# Patient Record
Sex: MALE | Race: BLACK OR AFRICAN AMERICAN | NOT HISPANIC OR LATINO | Employment: FULL TIME | RURAL
[De-identification: names, ages, dates, MRNs, and addresses within clinical notes are randomized per-mention and may not be internally consistent; named-entity substitution may affect disease eponyms.]

---
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
## 2021-04-29 ENCOUNTER — OFFICE VISIT (OUTPATIENT)
Dept: FAMILY MEDICINE | Facility: CLINIC | Age: 21
End: 2021-04-29
Payer: MEDICAID

## 2021-04-29 VITALS
TEMPERATURE: 98 F | OXYGEN SATURATION: 100 % | DIASTOLIC BLOOD PRESSURE: 94 MMHG | BODY MASS INDEX: 37.93 KG/M2 | HEART RATE: 66 BPM | HEIGHT: 72 IN | RESPIRATION RATE: 17 BRPM | WEIGHT: 280 LBS | SYSTOLIC BLOOD PRESSURE: 137 MMHG

## 2021-04-29 DIAGNOSIS — M54.2 DORSALGIA OF CERVICOTHORACIC REGION: Primary | ICD-10-CM

## 2021-04-29 DIAGNOSIS — M62.838 MUSCLE SPASM: ICD-10-CM

## 2021-04-29 DIAGNOSIS — M54.6 DORSALGIA OF CERVICOTHORACIC REGION: Primary | ICD-10-CM

## 2021-04-29 PROCEDURE — 96372 PR INJECTION,THERAP/PROPH/DIAG2ST, IM OR SUBCUT: ICD-10-PCS | Mod: ,,, | Performed by: NURSE PRACTITIONER

## 2021-04-29 PROCEDURE — 99213 PR OFFICE/OUTPT VISIT, EST, LEVL III, 20-29 MIN: ICD-10-PCS | Mod: 25,,, | Performed by: NURSE PRACTITIONER

## 2021-04-29 PROCEDURE — 99213 OFFICE O/P EST LOW 20 MIN: CPT | Mod: 25,,, | Performed by: NURSE PRACTITIONER

## 2021-04-29 PROCEDURE — 96372 THER/PROPH/DIAG INJ SC/IM: CPT | Mod: ,,, | Performed by: NURSE PRACTITIONER

## 2021-04-29 RX ORDER — KETOROLAC TROMETHAMINE 30 MG/ML
60 INJECTION, SOLUTION INTRAMUSCULAR; INTRAVENOUS
Status: COMPLETED | OUTPATIENT
Start: 2021-04-29 | End: 2021-04-29

## 2021-04-29 RX ORDER — DICLOFENAC SODIUM 75 MG/1
75 TABLET, DELAYED RELEASE ORAL 2 TIMES DAILY PRN
Qty: 60 TABLET | Refills: 0 | Status: SHIPPED | OUTPATIENT
Start: 2021-04-29 | End: 2023-05-24 | Stop reason: CLARIF

## 2021-04-29 RX ORDER — TIZANIDINE 4 MG/1
4 TABLET ORAL EVERY 8 HOURS PRN
Qty: 60 TABLET | Refills: 2 | Status: SHIPPED | OUTPATIENT
Start: 2021-04-29 | End: 2023-05-24 | Stop reason: CLARIF

## 2021-04-29 RX ORDER — DEXAMETHASONE SODIUM PHOSPHATE 4 MG/ML
6 INJECTION, SOLUTION INTRA-ARTICULAR; INTRALESIONAL; INTRAMUSCULAR; INTRAVENOUS; SOFT TISSUE
Status: COMPLETED | OUTPATIENT
Start: 2021-04-29 | End: 2021-04-29

## 2021-04-29 RX ADMIN — DEXAMETHASONE SODIUM PHOSPHATE 6 MG: 4 INJECTION, SOLUTION INTRA-ARTICULAR; INTRALESIONAL; INTRAMUSCULAR; INTRAVENOUS; SOFT TISSUE at 02:04

## 2021-04-29 RX ADMIN — KETOROLAC TROMETHAMINE 60 MG: 30 INJECTION, SOLUTION INTRAMUSCULAR; INTRAVENOUS at 02:04

## 2021-12-08 ENCOUNTER — OFFICE VISIT (OUTPATIENT)
Dept: FAMILY MEDICINE | Facility: CLINIC | Age: 21
End: 2021-12-08
Payer: MEDICAID

## 2021-12-08 VITALS
HEART RATE: 97 BPM | SYSTOLIC BLOOD PRESSURE: 157 MMHG | RESPIRATION RATE: 17 BRPM | WEIGHT: 300 LBS | DIASTOLIC BLOOD PRESSURE: 119 MMHG | TEMPERATURE: 98 F | HEIGHT: 71 IN | OXYGEN SATURATION: 98 % | BODY MASS INDEX: 42 KG/M2

## 2021-12-08 DIAGNOSIS — R51.9 FREQUENT HEADACHES: ICD-10-CM

## 2021-12-08 DIAGNOSIS — I10 PRIMARY HYPERTENSION: Primary | ICD-10-CM

## 2021-12-08 LAB
ANION GAP SERPL CALCULATED.3IONS-SCNC: 5 MMOL/L (ref 7–16)
BUN SERPL-MCNC: 10 MG/DL (ref 7–18)
BUN/CREAT SERPL: 10 (ref 6–20)
CALCIUM SERPL-MCNC: 9.9 MG/DL (ref 8.5–10.1)
CHLORIDE SERPL-SCNC: 108 MMOL/L (ref 98–107)
CO2 SERPL-SCNC: 29 MMOL/L (ref 21–32)
CREAT SERPL-MCNC: 1.03 MG/DL (ref 0.7–1.3)
GLUCOSE SERPL-MCNC: 98 MG/DL (ref 74–106)
POTASSIUM SERPL-SCNC: 4.2 MMOL/L (ref 3.5–5.1)
SODIUM SERPL-SCNC: 138 MMOL/L (ref 136–145)

## 2021-12-08 PROCEDURE — 80048 BASIC METABOLIC PNL TOTAL CA: CPT | Mod: ,,, | Performed by: CLINICAL MEDICAL LABORATORY

## 2021-12-08 PROCEDURE — 80048 BASIC METABOLIC PANEL: ICD-10-PCS | Mod: ,,, | Performed by: CLINICAL MEDICAL LABORATORY

## 2021-12-08 PROCEDURE — 99214 OFFICE O/P EST MOD 30 MIN: CPT | Mod: ,,, | Performed by: FAMILY MEDICINE

## 2021-12-08 PROCEDURE — 99214 PR OFFICE/OUTPT VISIT, EST, LEVL IV, 30-39 MIN: ICD-10-PCS | Mod: ,,, | Performed by: FAMILY MEDICINE

## 2021-12-08 RX ORDER — IBUPROFEN 800 MG/1
800 TABLET ORAL 3 TIMES DAILY PRN
Qty: 20 TABLET | Refills: 0 | Status: SHIPPED | OUTPATIENT
Start: 2021-12-08 | End: 2023-05-24 | Stop reason: CLARIF

## 2021-12-08 RX ORDER — HYDROCHLOROTHIAZIDE 25 MG/1
25 TABLET ORAL DAILY
Qty: 30 TABLET | Refills: 5 | Status: SHIPPED | OUTPATIENT
Start: 2021-12-08 | End: 2022-12-08

## 2021-12-08 RX ORDER — SUMATRIPTAN 50 MG/1
TABLET, FILM COATED ORAL
Qty: 10 TABLET | Refills: 0 | Status: SHIPPED | OUTPATIENT
Start: 2021-12-08 | End: 2023-05-24 | Stop reason: CLARIF

## 2022-02-06 ENCOUNTER — OFFICE VISIT (OUTPATIENT)
Dept: FAMILY MEDICINE | Facility: CLINIC | Age: 22
End: 2022-02-06
Payer: MEDICAID

## 2022-02-06 VITALS
SYSTOLIC BLOOD PRESSURE: 139 MMHG | BODY MASS INDEX: 42 KG/M2 | OXYGEN SATURATION: 99 % | WEIGHT: 300 LBS | HEART RATE: 76 BPM | DIASTOLIC BLOOD PRESSURE: 115 MMHG | RESPIRATION RATE: 18 BRPM | HEIGHT: 71 IN | TEMPERATURE: 98 F

## 2022-02-06 DIAGNOSIS — Z11.3 SCREENING FOR STD (SEXUALLY TRANSMITTED DISEASE): Primary | ICD-10-CM

## 2022-02-06 PROCEDURE — 87491 CHLMYD TRACH DNA AMP PROBE: CPT | Mod: ,,, | Performed by: CLINICAL MEDICAL LABORATORY

## 2022-02-06 PROCEDURE — 87389 HIV-1 AG W/HIV-1&-2 AB AG IA: CPT | Mod: ,,, | Performed by: CLINICAL MEDICAL LABORATORY

## 2022-02-06 PROCEDURE — 86695 HERPES SIMPLEX TYPE 1 TEST: CPT | Mod: ,,, | Performed by: CLINICAL MEDICAL LABORATORY

## 2022-02-06 PROCEDURE — 87491 CHLAMYDIA/GONORRHOEAE(GC), PCR: ICD-10-PCS | Mod: ,,, | Performed by: CLINICAL MEDICAL LABORATORY

## 2022-02-06 PROCEDURE — 86696 HERPES SIMPLEX TYPE 2 TEST: CPT | Mod: ,,, | Performed by: CLINICAL MEDICAL LABORATORY

## 2022-02-06 PROCEDURE — 1159F MED LIST DOCD IN RCRD: CPT | Mod: CPTII,,, | Performed by: FAMILY MEDICINE

## 2022-02-06 PROCEDURE — 3080F DIAST BP >= 90 MM HG: CPT | Mod: CPTII,,, | Performed by: FAMILY MEDICINE

## 2022-02-06 PROCEDURE — 1160F PR REVIEW ALL MEDS BY PRESCRIBER/CLIN PHARMACIST DOCUMENTED: ICD-10-PCS | Mod: CPTII,,, | Performed by: FAMILY MEDICINE

## 2022-02-06 PROCEDURE — 86780 TREPONEMA PALLIDUM: CPT | Mod: ,,, | Performed by: CLINICAL MEDICAL LABORATORY

## 2022-02-06 PROCEDURE — 99214 OFFICE O/P EST MOD 30 MIN: CPT | Mod: ,,, | Performed by: FAMILY MEDICINE

## 2022-02-06 PROCEDURE — 3008F BODY MASS INDEX DOCD: CPT | Mod: CPTII,,, | Performed by: FAMILY MEDICINE

## 2022-02-06 PROCEDURE — 86695 HERPES SIMPLEX 1 & 2 IGG: ICD-10-PCS | Mod: ,,, | Performed by: CLINICAL MEDICAL LABORATORY

## 2022-02-06 PROCEDURE — 1160F RVW MEDS BY RX/DR IN RCRD: CPT | Mod: CPTII,,, | Performed by: FAMILY MEDICINE

## 2022-02-06 PROCEDURE — 1159F PR MEDICATION LIST DOCUMENTED IN MEDICAL RECORD: ICD-10-PCS | Mod: CPTII,,, | Performed by: FAMILY MEDICINE

## 2022-02-06 PROCEDURE — 87389 HIV 1 / 2 ANTIBODY: ICD-10-PCS | Mod: ,,, | Performed by: CLINICAL MEDICAL LABORATORY

## 2022-02-06 PROCEDURE — 86694 HERPES SIMPLEX 1 & 2 IGM: ICD-10-PCS | Mod: ,,, | Performed by: CLINICAL MEDICAL LABORATORY

## 2022-02-06 PROCEDURE — 86696 HERPES SIMPLEX 1 & 2 IGG: ICD-10-PCS | Mod: ,,, | Performed by: CLINICAL MEDICAL LABORATORY

## 2022-02-06 PROCEDURE — 99051 PR MEDICAL SERVICES, EVE/WKEND/HOLIDAY: ICD-10-PCS | Mod: ,,, | Performed by: FAMILY MEDICINE

## 2022-02-06 PROCEDURE — 3080F PR MOST RECENT DIASTOLIC BLOOD PRESSURE >= 90 MM HG: ICD-10-PCS | Mod: CPTII,,, | Performed by: FAMILY MEDICINE

## 2022-02-06 PROCEDURE — 86780 TREPONEMA PALLIDUM (SYPHILIS) ANTIBODY: ICD-10-PCS | Mod: ,,, | Performed by: CLINICAL MEDICAL LABORATORY

## 2022-02-06 PROCEDURE — 86694 HERPES SIMPLEX NES ANTBDY: CPT | Mod: ,,, | Performed by: CLINICAL MEDICAL LABORATORY

## 2022-02-06 PROCEDURE — 99051 MED SERV EVE/WKEND/HOLIDAY: CPT | Mod: ,,, | Performed by: FAMILY MEDICINE

## 2022-02-06 PROCEDURE — 3008F PR BODY MASS INDEX (BMI) DOCUMENTED: ICD-10-PCS | Mod: CPTII,,, | Performed by: FAMILY MEDICINE

## 2022-02-06 PROCEDURE — 87591 CHLAMYDIA/GONORRHOEAE(GC), PCR: ICD-10-PCS | Mod: ,,, | Performed by: CLINICAL MEDICAL LABORATORY

## 2022-02-06 PROCEDURE — 99214 PR OFFICE/OUTPT VISIT, EST, LEVL IV, 30-39 MIN: ICD-10-PCS | Mod: ,,, | Performed by: FAMILY MEDICINE

## 2022-02-06 PROCEDURE — 3075F SYST BP GE 130 - 139MM HG: CPT | Mod: CPTII,,, | Performed by: FAMILY MEDICINE

## 2022-02-06 PROCEDURE — 87591 N.GONORRHOEAE DNA AMP PROB: CPT | Mod: ,,, | Performed by: CLINICAL MEDICAL LABORATORY

## 2022-02-06 PROCEDURE — 3075F PR MOST RECENT SYSTOLIC BLOOD PRESS GE 130-139MM HG: ICD-10-PCS | Mod: CPTII,,, | Performed by: FAMILY MEDICINE

## 2022-02-06 RX ORDER — VALACYCLOVIR HYDROCHLORIDE 1 G/1
1000 TABLET, FILM COATED ORAL 3 TIMES DAILY
Qty: 21 TABLET | Refills: 2 | Status: SHIPPED | OUTPATIENT
Start: 2022-02-06 | End: 2022-02-13

## 2022-02-06 NOTE — PROGRESS NOTES
Subjective:       Patient ID: Ángel Golden is a 21 y.o. male.    Chief Complaint: STD CHECK (States there is lesions on the penis. Noticed Friday. Pain. No known exposure of STD.)    HPI  Review of Systems   Constitutional: Negative for activity change, appetite change, chills, diaphoresis, fatigue, fever and unexpected weight change.   HENT: Negative for nasal congestion, dental problem, drooling, ear discharge, ear pain, facial swelling, hearing loss, mouth sores, nosebleeds, postnasal drip, rhinorrhea, sinus pressure/congestion, sneezing, sore throat, tinnitus, trouble swallowing, voice change and goiter.    Eyes: Negative for photophobia, pain, discharge, redness, itching and visual disturbance.   Respiratory: Negative for apnea, cough, choking, chest tightness, shortness of breath, wheezing and stridor.    Cardiovascular: Negative for chest pain, palpitations, leg swelling and claudication.   Gastrointestinal: Negative for abdominal distention, abdominal pain, anal bleeding, blood in stool, change in bowel habit, constipation, diarrhea, nausea, vomiting, reflux, fecal incontinence and change in bowel habit.   Endocrine: Negative for cold intolerance, heat intolerance, polydipsia, polyphagia and polyuria.   Genitourinary: Positive for penile pain. Negative for bladder incontinence, decreased urine volume, difficulty urinating, discharge, dysuria, enuresis, erectile dysfunction, flank pain, frequency, genital sores, hematuria, testicular pain and urgency.   Musculoskeletal: Negative for arthralgias, back pain, gait problem, joint swelling, leg pain, myalgias, neck pain, neck stiffness and joint deformity.   Integumentary:  Positive for rash. Negative for pallor, wound and mole/lesion.   Allergic/Immunologic: Negative for environmental allergies, food allergies and frequent infections.   Neurological: Negative for dizziness, vertigo, tremors, seizures, syncope, facial asymmetry, speech difficulty, weakness,  light-headedness, numbness, headaches, disturbances in coordination, memory loss and coordination difficulties.   Hematological: Negative for adenopathy. Does not bruise/bleed easily.   Psychiatric/Behavioral: Negative for agitation, behavioral problems, confusion, decreased concentration, dysphoric mood, hallucinations, self-injury, sleep disturbance and suicidal ideas. The patient is not nervous/anxious and is not hyperactive.          Objective:      Physical Exam  Vitals reviewed.   Constitutional:       Appearance: Normal appearance. He is normal weight.   HENT:      Head: Normocephalic and atraumatic.      Right Ear: Tympanic membrane and ear canal normal.      Left Ear: Tympanic membrane, ear canal and external ear normal.      Nose: Nose normal.      Mouth/Throat:      Mouth: Mucous membranes are moist.      Pharynx: Oropharynx is clear.   Eyes:      Extraocular Movements: Extraocular movements intact.      Conjunctiva/sclera: Conjunctivae normal.      Pupils: Pupils are equal, round, and reactive to light.   Cardiovascular:      Rate and Rhythm: Normal rate and regular rhythm.      Pulses: Normal pulses.      Heart sounds: Normal heart sounds.   Pulmonary:      Effort: Pulmonary effort is normal.      Breath sounds: Normal breath sounds.   Abdominal:      General: Abdomen is flat. Bowel sounds are normal.      Palpations: Abdomen is soft.   Genitourinary:     Comments: 2 ulcerated lesion on head of penis.  Musculoskeletal:         General: Normal range of motion.      Cervical back: Normal range of motion and neck supple.   Skin:     General: Skin is warm and dry.   Neurological:      General: No focal deficit present.      Mental Status: He is alert and oriented to person, place, and time. Mental status is at baseline.   Psychiatric:         Mood and Affect: Mood normal.         Behavior: Behavior normal.         Thought Content: Thought content normal.         Judgment: Judgment normal.          Assessment:       1. Screening for STD (sexually transmitted disease)        Plan:     Screening for STD (sexually transmitted disease)  -     HSV 1 & 2, IgM; Future; Expected date: 02/06/2022  -     HSV 1 & 2, IgG; Future; Expected date: 02/06/2022  -     Syphilis Antibody with reflex to RPR; Future; Expected date: 02/06/2022  -     HIV 1/2 Ag/Ab (4th Gen); Future; Expected date: 02/06/2022  -     Chlamydia/GC, PCR; Future; Expected date: 02/06/2022    Other orders  -     valACYclovir (VALTREX) 1000 MG tablet; Take 1 tablet (1,000 mg total) by mouth 3 (three) times daily. for 7 days  Dispense: 21 tablet; Refill: 2       Ulcerated lesions likely from herpes, will treat presumptively for this, will check std panel.

## 2022-02-07 LAB
CHLAMYDIA BY PCR: NEGATIVE
HIV 1+O+2 AB SERPL QL: NORMAL
N. GONORRHOEAE (GC) BY PCR: NEGATIVE
SYPHILIS AB INTERPRETATION: NORMAL

## 2022-02-08 LAB
HSV TYPE 1 AB IGG INDEX: 0.22
HSV TYPE 2 AB IGG INDEX: 0.16
HSV1 IGG SER QL: NEGATIVE
HSV2 IGG SER QL: NEGATIVE

## 2022-02-09 LAB — HSV IGM SER QL IA: NEGATIVE

## 2022-02-14 ENCOUNTER — TELEPHONE (OUTPATIENT)
Dept: FAMILY MEDICINE | Facility: CLINIC | Age: 22
End: 2022-02-14
Payer: MEDICAID

## 2022-02-14 NOTE — TELEPHONE ENCOUNTER
Notified pt of normal labs. Pt verbalized understanding with no further questions. ----- Message from Elsie Donaldson LPN sent at 2/14/2022  7:55 AM CST -----    ----- Message -----  From: Jose D Mayers II, DO  Sent: 2/10/2022   6:53 PM CST  To: Elsie Donaldson LPN    Std panel negative, notify patient

## 2023-01-10 ENCOUNTER — LAB VISIT (OUTPATIENT)
Dept: PRIMARY CARE CLINIC | Facility: CLINIC | Age: 23
End: 2023-01-10

## 2023-01-10 DIAGNOSIS — Z02.83 ENCOUNTER FOR DRUG SCREENING: Primary | ICD-10-CM

## 2023-01-10 PROCEDURE — 99000 PR SPECIMEN HANDLING,DR OFF->LAB: ICD-10-PCS | Mod: ,,, | Performed by: NURSE PRACTITIONER

## 2023-01-10 PROCEDURE — 99000 SPECIMEN HANDLING OFFICE-LAB: CPT | Mod: ,,, | Performed by: NURSE PRACTITIONER

## 2023-01-10 NOTE — PROGRESS NOTES
Subjective:       Patient ID: Ángel Golden is a 22 y.o. male.    Chief Complaint: No chief complaint on file.    HPI  Review of Systems      Objective:      Physical Exam    Assessment:       Problem List Items Addressed This Visit    None  Visit Diagnoses       Encounter for drug screening    -  Primary            Plan:       Drug testing only

## 2023-03-20 ENCOUNTER — HOSPITAL ENCOUNTER (EMERGENCY)
Facility: HOSPITAL | Age: 23
Discharge: HOME | End: 2023-03-20
Attending: EMERGENCY MEDICINE
Payer: MEDICAID

## 2023-03-20 VITALS
RESPIRATION RATE: 20 BRPM | SYSTOLIC BLOOD PRESSURE: 116 MMHG | WEIGHT: 258 LBS | OXYGEN SATURATION: 98 % | TEMPERATURE: 99 F | HEART RATE: 70 BPM | DIASTOLIC BLOOD PRESSURE: 73 MMHG | BODY MASS INDEX: 36.12 KG/M2 | HEIGHT: 71 IN

## 2023-03-20 DIAGNOSIS — J06.9 VIRAL URI WITH COUGH: Primary | ICD-10-CM

## 2023-03-20 PROCEDURE — 99283 EMERGENCY DEPT VISIT LOW MDM: CPT | Mod: ,,, | Performed by: EMERGENCY MEDICINE

## 2023-03-20 PROCEDURE — 99283 EMERGENCY DEPT VISIT LOW MDM: CPT

## 2023-03-20 PROCEDURE — 99283 PR EMERGENCY DEPT VISIT,LEVEL III: ICD-10-PCS | Mod: ,,, | Performed by: EMERGENCY MEDICINE

## 2023-03-20 RX ORDER — BENZONATATE 200 MG/1
200 CAPSULE ORAL 3 TIMES DAILY PRN
Qty: 30 CAPSULE | Refills: 0 | Status: SHIPPED | OUTPATIENT
Start: 2023-03-20 | End: 2023-03-30

## 2023-03-20 RX ORDER — GUAIFENESIN 600 MG/1
TABLET, EXTENDED RELEASE ORAL
Qty: 40 TABLET | Refills: 0 | Status: SHIPPED | OUTPATIENT
Start: 2023-03-20 | End: 2023-05-24 | Stop reason: CLARIF

## 2023-03-20 NOTE — DISCHARGE INSTRUCTIONS
TAKE MUCINEX AND TESSALON AS DIRECTED.  DRINK PLENTY OF FLUIDS.  FOLLOW UP IF SYMPTOMS PERSIST OR WORSEN OR OTHERWISE AS NEEDED.

## 2023-03-20 NOTE — Clinical Note
"Ángel Rodríguez" [place] was seen and treated in our emergency department on 3/20/2023.  He may return to work on 03/21/2023.       If you have any questions or concerns, please don't hesitate to call.      Souleymane HOPE    "

## 2023-03-20 NOTE — ED PROVIDER NOTES
Encounter Date: 3/20/2023       History     Chief Complaint   Patient presents with    Sore Throat     21 y/o male with cough and nasal congestion that started 2 days ago.  He reports mild intermittent sore throat.  Family members have had similar symptoms.      Review of patient's allergies indicates:  No Known Allergies  No past medical history on file.  No past surgical history on file.  No family history on file.  Social History     Tobacco Use    Smoking status: Never    Smokeless tobacco: Never   Substance Use Topics    Alcohol use: Never    Drug use: Never     Review of Systems   All other systems reviewed and are negative.    Physical Exam     Initial Vitals [03/20/23 0836]   BP Pulse Resp Temp SpO2   116/73 70 20 98.6 °F (37 °C) 98 %      MAP       --         Physical Exam    Nursing note and vitals reviewed.  Constitutional: He appears well-developed and well-nourished.   HENT:   Head: Normocephalic and atraumatic.   Post-nasal drainage.  Nasal mucosal edema.     Eyes: Conjunctivae and EOM are normal. Pupils are equal, round, and reactive to light.   Neck: Neck supple.   Normal range of motion.  Cardiovascular:  Normal rate, regular rhythm, normal heart sounds and intact distal pulses.           Pulmonary/Chest: Breath sounds normal.   Abdominal: Abdomen is soft. Bowel sounds are normal.   Musculoskeletal:         General: Normal range of motion.      Cervical back: Normal range of motion and neck supple.     Neurological: He is alert and oriented to person, place, and time. He has normal strength. GCS score is 15. GCS eye subscore is 4. GCS verbal subscore is 5. GCS motor subscore is 6.   Skin: Skin is warm and dry. Capillary refill takes less than 2 seconds.   Psychiatric: He has a normal mood and affect.       Medical Screening Exam   See Full Note    ED Course   Procedures  Labs Reviewed - No data to display       Imaging Results    None          Medications - No data to display  Medical Decision  Making:   Initial Assessment:   Cough and nasal congestion and sore throat.    Differential Diagnosis:   Uri vs allergic rhinitis vs other  ED Management:  DX:  URI.  Rx:  tessalon and mucinex.                   Clinical Impression:   Final diagnoses:  [J06.9] Viral URI with cough (Primary)        ED Disposition Condition    Discharge Stable          ED Prescriptions       Medication Sig Dispense Start Date End Date Auth. Provider    benzonatate (TESSALON) 200 MG capsule Take 1 capsule (200 mg total) by mouth 3 (three) times daily as needed for Cough. 30 capsule 3/20/2023 3/30/2023 Will Silvestre MD    guaiFENesin (MUCINEX) 600 mg 12 hr tablet 2 tablets po every 12 hours as needed. 40 tablet 3/20/2023 -- Will Silvestre MD          Follow-up Information       Follow up With Specialties Details Why Contact Info    PRIMARY CARE PROVIDER   As needed              Will Silvestre MD  03/20/23 2361       Will Silvestre MD  03/20/23 9302

## 2023-04-25 ENCOUNTER — HOSPITAL ENCOUNTER (EMERGENCY)
Facility: HOSPITAL | Age: 23
Discharge: HOME | End: 2023-04-25
Attending: EMERGENCY MEDICINE
Payer: MEDICAID

## 2023-04-25 VITALS
SYSTOLIC BLOOD PRESSURE: 134 MMHG | BODY MASS INDEX: 35.42 KG/M2 | TEMPERATURE: 97 F | HEIGHT: 71 IN | HEART RATE: 72 BPM | RESPIRATION RATE: 16 BRPM | DIASTOLIC BLOOD PRESSURE: 91 MMHG | WEIGHT: 253 LBS | OXYGEN SATURATION: 98 %

## 2023-04-25 DIAGNOSIS — H60.01 ABSCESS, EAR CANAL, RIGHT: Primary | ICD-10-CM

## 2023-04-25 PROCEDURE — 10060 I&D ABSCESS SIMPLE/SINGLE: CPT | Mod: ,,, | Performed by: EMERGENCY MEDICINE

## 2023-04-25 PROCEDURE — 10060 I&D ABSCESS SIMPLE/SINGLE: CPT

## 2023-04-25 PROCEDURE — 99284 EMERGENCY DEPT VISIT MOD MDM: CPT | Mod: 25,,, | Performed by: EMERGENCY MEDICINE

## 2023-04-25 PROCEDURE — 25000003 PHARM REV CODE 250: Performed by: EMERGENCY MEDICINE

## 2023-04-25 PROCEDURE — 10060 PR DRAIN SKIN ABSCESS SIMPLE: ICD-10-PCS | Mod: ,,, | Performed by: EMERGENCY MEDICINE

## 2023-04-25 PROCEDURE — 69000 DRG XTRNL EAR ABSC/HEM SMPL: CPT

## 2023-04-25 PROCEDURE — 99284 PR EMERGENCY DEPT VISIT,LEVEL IV: ICD-10-PCS | Mod: 25,,, | Performed by: EMERGENCY MEDICINE

## 2023-04-25 PROCEDURE — 99283 EMERGENCY DEPT VISIT LOW MDM: CPT | Mod: 25

## 2023-04-25 RX ORDER — SULFAMETHOXAZOLE AND TRIMETHOPRIM 800; 160 MG/1; MG/1
1 TABLET ORAL 2 TIMES DAILY
Qty: 14 TABLET | Refills: 0 | Status: SHIPPED | OUTPATIENT
Start: 2023-04-25 | End: 2023-05-02

## 2023-04-25 RX ORDER — LIDOCAINE HYDROCHLORIDE 10 MG/ML
5 INJECTION, SOLUTION EPIDURAL; INFILTRATION; INTRACAUDAL; PERINEURAL
Status: COMPLETED | OUTPATIENT
Start: 2023-04-25 | End: 2023-04-25

## 2023-04-25 RX ADMIN — LIDOCAINE HYDROCHLORIDE 50 MG: 10 SOLUTION INTRAVENOUS at 08:04

## 2023-04-25 NOTE — ED NOTES
Consent signed for Incision and Drainage after risk and benefits discussed per Dr Moss.  Dr Moss in room to perform Incision and Drainage of Right Ear.

## 2023-04-25 NOTE — Clinical Note
"Ángel Velazquezolivia Golden was seen and treated in our emergency department on 4/25/2023.  He may return to work on 04/26/2023.       If you have any questions or concerns, please don't hesitate to call.      Grzegorz Moss MD"

## 2023-04-25 NOTE — ED PROVIDER NOTES
Encounter Date: 4/25/2023       History     Chief Complaint   Patient presents with    Abscess     Complains of a sore swollen area to the area of the right external pinna/external auditory canal junction.  Minimal pain.  No fever.  No hearing loss.    Review of patient's allergies indicates:  No Known Allergies  Past Medical History:   Diagnosis Date    Hypertension      History reviewed. No pertinent surgical history.  History reviewed. No pertinent family history.  Social History     Tobacco Use    Smoking status: Every Day     Types: Cigarettes    Smokeless tobacco: Never   Substance Use Topics    Alcohol use: Never    Drug use: Never     Review of Systems   Constitutional:  Negative for fever.   HENT:  Negative for sore throat.    Respiratory:  Negative for shortness of breath.    Cardiovascular:  Negative for chest pain.   Gastrointestinal:  Negative for nausea.   Genitourinary:  Negative for dysuria.   Musculoskeletal:  Negative for back pain.   Skin:  Negative for rash.   Neurological:  Negative for weakness.   Hematological:  Does not bruise/bleed easily.     Physical Exam     Initial Vitals [04/25/23 0820]   BP Pulse Resp Temp SpO2   (!) 134/91 72 16 97.4 °F (36.3 °C) 98 %      MAP       --         Physical Exam    Nursing note and vitals reviewed.  Constitutional: He appears well-developed and well-nourished.   HENT:   Head: Normocephalic and atraumatic.   Right external auditory canal/pinna with proximally 5 mm fluctuant area on the inferior aspect.  No surrounding erythema or induration.  No lymphadenopathy.  External auditory canal is patent   Eyes: EOM are normal. Pupils are equal, round, and reactive to light.   Neck: Neck supple. No thyromegaly present. No JVD present.   Normal range of motion.  Cardiovascular:  Normal rate, regular rhythm, normal heart sounds and intact distal pulses.           No murmur heard.  Pulmonary/Chest: Breath sounds normal. No stridor. No respiratory distress. He has no  wheezes.   Abdominal: Abdomen is soft. Bowel sounds are normal. He exhibits no distension. There is no abdominal tenderness.   Musculoskeletal:         General: No tenderness or edema. Normal range of motion.      Cervical back: Normal range of motion and neck supple.     Lymphadenopathy:     He has no cervical adenopathy.   Neurological: He is alert and oriented to person, place, and time. He has normal strength. No cranial nerve deficit or sensory deficit. GCS score is 15. GCS eye subscore is 4. GCS verbal subscore is 5. GCS motor subscore is 6.   Skin: Skin is warm and dry. Capillary refill takes less than 2 seconds. No rash noted.   Psychiatric: He has a normal mood and affect.       Medical Screening Exam   See Full Note    ED Course   I & D - Incision and Drainage    Date/Time: 4/26/2023 7:38 AM  Location procedure was performed: [facility] EMERGENCY DEPARTMENT  Performed by: Grzegorz Moss MD  Authorized by: Grzegorz Moss MD   Pre-operative diagnosis: abscess  Consent Done: Yes  Consent: Verbal consent obtained.  Consent given by: patient  Type: abscess  Body area: head/neck  Location details: right external ear  Anesthesia: local infiltration    Anesthesia:  Local Anesthetic: lidocaine 1% without epinephrine  Anesthetic total: 0.5 mL    Patient sedated: no  Scalpel size: 11  Incision type: single straight  Complexity: simple  Drainage: viscous  Drainage amount: moderate  Wound treatment: incision  Packing material: none  Complications: No  Specimens: No  Implants: No      Labs Reviewed - No data to display       Imaging Results    None          Medications   LIDOcaine (PF) 10 mg/ml (1%) injection 50 mg (50 mg Infiltration Given 4/25/23 0834)     Medical Decision Making:   ED Management:  [facility]    Patient presents for emergent evaluation of acute abscess right year that poses a threat to life and/or bodily function.    In the ED patient found to have acute abscess.      Discharge [facility]  Patient was  managed in the ED with incision and drainage.    The response to treatment was improved.    Patient was discharged in stable condition.  Detailed return precautions discussed.                        Clinical Impression:   Final diagnoses:  [H60.01] Abscess, ear canal, right (Primary)        ED Disposition Condition    Discharge Stable          ED Prescriptions       Medication Sig Dispense Start Date End Date Auth. Provider    sulfamethoxazole-trimethoprim 800-160mg (BACTRIM DS) 800-160 mg Tab Take 1 tablet by mouth 2 (two) times daily. for 7 days 14 tablet 4/25/2023 5/2/2023 Grzegorz Moss MD          Follow-up Information       Follow up With Specialties Details Why Contact Info    [facility] - Emergency Department Emergency Medicine Go to  As needed, If symptoms worsen [address] [place] [id number] [phone]             Grzegorz Moss MD  04/26/23 4798

## 2023-05-01 ENCOUNTER — OFFICE VISIT (OUTPATIENT)
Dept: OTOLARYNGOLOGY | Facility: CLINIC | Age: 23
End: 2023-05-01
Payer: MEDICAID

## 2023-05-01 VITALS — BODY MASS INDEX: 35.42 KG/M2 | WEIGHT: 253 LBS | HEIGHT: 71 IN

## 2023-05-01 DIAGNOSIS — H60.01 ABSCESS, EAR CANAL, RIGHT: ICD-10-CM

## 2023-05-01 PROCEDURE — 1160F PR REVIEW ALL MEDS BY PRESCRIBER/CLIN PHARMACIST DOCUMENTED: ICD-10-PCS | Mod: CPTII,,, | Performed by: OTOLARYNGOLOGY

## 2023-05-01 PROCEDURE — 1159F MED LIST DOCD IN RCRD: CPT | Mod: CPTII,,, | Performed by: OTOLARYNGOLOGY

## 2023-05-01 PROCEDURE — 99203 OFFICE O/P NEW LOW 30 MIN: CPT | Mod: S$PBB,,, | Performed by: OTOLARYNGOLOGY

## 2023-05-01 PROCEDURE — 99213 OFFICE O/P EST LOW 20 MIN: CPT | Mod: PBBFAC | Performed by: OTOLARYNGOLOGY

## 2023-05-01 PROCEDURE — 99203 PR OFFICE/OUTPT VISIT, NEW, LEVL III, 30-44 MIN: ICD-10-PCS | Mod: S$PBB,,, | Performed by: OTOLARYNGOLOGY

## 2023-05-01 PROCEDURE — 3008F BODY MASS INDEX DOCD: CPT | Mod: CPTII,,, | Performed by: OTOLARYNGOLOGY

## 2023-05-01 PROCEDURE — 1159F PR MEDICATION LIST DOCUMENTED IN MEDICAL RECORD: ICD-10-PCS | Mod: CPTII,,, | Performed by: OTOLARYNGOLOGY

## 2023-05-01 PROCEDURE — 3008F PR BODY MASS INDEX (BMI) DOCUMENTED: ICD-10-PCS | Mod: CPTII,,, | Performed by: OTOLARYNGOLOGY

## 2023-05-01 PROCEDURE — 1160F RVW MEDS BY RX/DR IN RCRD: CPT | Mod: CPTII,,, | Performed by: OTOLARYNGOLOGY

## 2023-05-01 NOTE — PROGRESS NOTES
Subjective:       Patient ID: Ángel Golden is a 22 y.o. male.    Chief Complaint: Otalgia (RIGHT EAR ABSCESS-PT STATES IT IS BETTER-)  Took antibiotic and drained in er now better  Otalgia     Review of Systems   HENT:  Positive for ear pain.    All other systems reviewed and are negative.    Objective:      Physical Exam  General: NAD  Head: Normocephalic, atraumatic, no facial asymmetry/normal strength,  Ears: Both auricules normal in appearance, w/o deformities tympanic membranes normal external auditory canals normal  Nose: External nose w/o deformities normal turbinates no drainage or inflammation  Oral Cavity: Lips, gums, floor of mouth, tongue hard palate, and buccal mucosa without mass/lesion  Oropharynx: Mucosa pink and moist, soft palate, posterior pharynx and oropharyngeal wall without mass/lesion  Neck: Supple, symmetric, trachea midline, no palpable mass/lesion, no palpable cervical lymphadenopathy  Skin: Warm and dry, no concerning lesions  Respiratory: Respirations even, unlabored   Assessment:       1. Abscess, ear canal, right        Plan:       F/u when re appears

## 2023-05-24 ENCOUNTER — HOSPITAL ENCOUNTER (EMERGENCY)
Facility: HOSPITAL | Age: 23
Discharge: HOME | End: 2023-05-24
Attending: FAMILY MEDICINE
Payer: MEDICAID

## 2023-05-24 VITALS
TEMPERATURE: 98 F | WEIGHT: 249 LBS | HEIGHT: 71 IN | SYSTOLIC BLOOD PRESSURE: 150 MMHG | OXYGEN SATURATION: 99 % | HEART RATE: 74 BPM | RESPIRATION RATE: 18 BRPM | DIASTOLIC BLOOD PRESSURE: 84 MMHG | BODY MASS INDEX: 34.86 KG/M2

## 2023-05-24 DIAGNOSIS — B08.1 MOLLUSCUM CONTAGIOSUM: Primary | ICD-10-CM

## 2023-05-24 PROCEDURE — 99283 PR EMERGENCY DEPT VISIT,LEVEL III: ICD-10-PCS | Mod: ,,, | Performed by: FAMILY MEDICINE

## 2023-05-24 PROCEDURE — 99283 EMERGENCY DEPT VISIT LOW MDM: CPT | Mod: ,,, | Performed by: FAMILY MEDICINE

## 2023-05-24 PROCEDURE — 99282 EMERGENCY DEPT VISIT SF MDM: CPT

## 2023-05-24 NOTE — ED PROVIDER NOTES
Encounter Date: 5/24/2023       History     Chief Complaint   Patient presents with    Genital Warts     Patient comes in with history of outbreaks small umbilicated bumps that are in the pubic region.  Never diagnosed with genitalia warts.      Review of patient's allergies indicates:  No Known Allergies  Past Medical History:   Diagnosis Date    Hypertension      No past surgical history on file.  No family history on file.  Social History     Tobacco Use    Smoking status: Every Day     Types: Cigarettes    Smokeless tobacco: Never   Substance Use Topics    Alcohol use: Never    Drug use: Never     Review of Systems   Constitutional:  Positive for fatigue. Negative for fever.   HENT: Negative.  Negative for sore throat.    Eyes: Negative.    Respiratory: Negative.  Negative for shortness of breath.    Cardiovascular: Negative.  Negative for chest pain.   Gastrointestinal: Negative.  Negative for nausea.   Endocrine: Negative.    Genitourinary: Negative.  Negative for dysuria.   Musculoskeletal: Negative.  Negative for back pain.   Skin: Negative.  Negative for rash.        Small circulated lesions that are umbilicated   Allergic/Immunologic: Negative.    Neurological: Negative.  Negative for weakness.   Hematological: Negative.  Does not bruise/bleed easily.   Psychiatric/Behavioral: Negative.       Physical Exam     Initial Vitals [05/24/23 0734]   BP Pulse Resp Temp SpO2   (!) 150/84 74 18 98 °F (36.7 °C) 99 %      MAP       --         Physical Exam    Constitutional: He appears well-developed and well-nourished.   HENT:   Head: Normocephalic and atraumatic.   Right Ear: External ear normal.   Left Ear: External ear normal.   Nose: Nose normal.   Mouth/Throat: Oropharynx is clear and moist.   Eyes: Conjunctivae and EOM are normal. Pupils are equal, round, and reactive to light.   Neck: Neck supple.   Normal range of motion.  Cardiovascular:  Normal rate, regular rhythm, normal heart sounds and intact distal  pulses.           Pulmonary/Chest: Breath sounds normal.   Abdominal: Abdomen is soft. Bowel sounds are normal.   Genitourinary:    Prostate and penis normal.     Musculoskeletal:         General: Normal range of motion.      Cervical back: Normal range of motion and neck supple.     Neurological: He is alert and oriented to person, place, and time. He has normal strength and normal reflexes.   Skin: Skin is warm and dry.   Patient with to umbilicated lesions.   Psychiatric: He has a normal mood and affect. His behavior is normal. Judgment and thought content normal.       Medical Screening Exam   See Full Note    ED Course   Procedures  Labs Reviewed - No data to display       Imaging Results    None          Medications - No data to display  Medical Decision Making:   Initial Assessment:   Patient with to umbilicated lesions that are small the looks like molluscum contagiosum  Differential Diagnosis:   Molluscum contagiosum  ED Management:  I feel that this will be a self eliminating feature.  Explained to the patient do not pop them because they can spread secondary to the HPV virus  Advised the patient to go to primary care provider to get either podopholine treatments for the HPV or  trichlorascetic acid treatment                       Clinical Impression:   Final diagnoses:  [B08.1] Molluscum contagiosum (Primary)        ED Disposition Condition    Discharge Stable          ED Prescriptions    None       Follow-up Information    None          Virgil Henning,   05/24/23 0807

## 2023-05-24 NOTE — DISCHARGE INSTRUCTIONS
Follow-up will primary care provider for treatment otherwise do not pop the lesions let them be it should be self eliminating

## 2023-05-24 NOTE — ED TRIAGE NOTES
PATIENT PRESENTS TO ER WITH COMPLAINT OF GENITAL WARTS. REPORTS THEY POPPED UP ABOUT 3 DAYS AGO AND HAS HAD A HX OF THIS AS WELL.

## 2023-06-27 ENCOUNTER — HOSPITAL ENCOUNTER (EMERGENCY)
Facility: HOSPITAL | Age: 23
Discharge: HOME | End: 2023-06-27
Payer: MEDICAID

## 2023-06-27 VITALS
BODY MASS INDEX: 33.86 KG/M2 | RESPIRATION RATE: 16 BRPM | HEART RATE: 95 BPM | TEMPERATURE: 98 F | HEIGHT: 72 IN | DIASTOLIC BLOOD PRESSURE: 81 MMHG | SYSTOLIC BLOOD PRESSURE: 141 MMHG | OXYGEN SATURATION: 94 % | WEIGHT: 250 LBS

## 2023-06-27 DIAGNOSIS — B37.89 CANDIDA RASH OF GROIN: Primary | ICD-10-CM

## 2023-06-27 DIAGNOSIS — L08.9 ABRASION OF SKIN WITH INFECTION: ICD-10-CM

## 2023-06-27 DIAGNOSIS — T14.8XXA ABRASION OF SKIN WITH INFECTION: ICD-10-CM

## 2023-06-27 PROCEDURE — 99284 EMERGENCY DEPT VISIT MOD MDM: CPT | Mod: ,,, | Performed by: NURSE PRACTITIONER

## 2023-06-27 PROCEDURE — 99284 PR EMERGENCY DEPT VISIT,LEVEL IV: ICD-10-PCS | Mod: ,,, | Performed by: NURSE PRACTITIONER

## 2023-06-27 PROCEDURE — 99284 EMERGENCY DEPT VISIT MOD MDM: CPT

## 2023-06-27 RX ORDER — DOXYCYCLINE 100 MG/1
100 CAPSULE ORAL 2 TIMES DAILY
Qty: 20 CAPSULE | Refills: 0 | Status: SHIPPED | OUTPATIENT
Start: 2023-06-27 | End: 2023-07-07

## 2023-06-27 RX ORDER — NYSTATIN 100000 U/G
OINTMENT TOPICAL 2 TIMES DAILY
Qty: 30 G | Refills: 0 | Status: SHIPPED | OUTPATIENT
Start: 2023-06-27

## 2023-06-27 NOTE — Clinical Note
"Ángel Rodríguez"[place] was seen and treated in our emergency department on 6/27/2023.  He may return to work on 06/28/2023.       If you have any questions or concerns, please don't hesitate to call.      DAVE Casiano"

## 2023-06-27 NOTE — ED PROVIDER NOTES
Encounter Date: 6/27/2023       History     Chief Complaint   Patient presents with    Skin Ulcer     Patient presents to the ER with complaint skin irritation/lesion in the groin area.  He states he noted 1st noticed area 2 days ago after shaving.  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  He denies penile irritation or lesions.  No penile discharge or testicular pain.  Patient was in ER with his wife reports he was not recently changed sexual partners.  Wife denies skin lesions or similar complaints.      The history is provided by the patient. No  was used.   Review of patient's allergies indicates:  No Known Allergies  Past Medical History:   Diagnosis Date    Hypertension      No past surgical history on file.  No family history on file.  Social History     Tobacco Use    Smoking status: Every Day     Types: Cigarettes    Smokeless tobacco: Never   Substance Use Topics    Alcohol use: Never    Drug use: Never     Review of Systems   Skin:  Positive for rash and wound.   All other systems reviewed and are negative.    Physical Exam     Initial Vitals [06/27/23 1224]   BP Pulse Resp Temp SpO2   (!) 141/81 95 16 98.3 °F (36.8 °C) (!) 94 %      MAP       --         Physical Exam    Nursing note and vitals reviewed.  Constitutional: He appears well-developed and well-nourished.   HENT:   Head: Normocephalic.   Right Ear: External ear normal.   Left Ear: External ear normal.   Nose: Nose normal.   Mouth/Throat: Oropharynx is clear and moist.   Eyes: Conjunctivae and EOM are normal. Pupils are equal, round, and reactive to light.   Neck: Neck supple.   Normal range of motion.  Cardiovascular:  Normal rate, regular rhythm, normal heart sounds and intact distal pulses.           Pulmonary/Chest: Breath sounds normal.   Abdominal: Abdomen is soft. Bowel sounds are normal.   Musculoskeletal:         General: Normal range of motion.      Cervical back: Normal  range of motion and neck supple.     Neurological: He is alert and oriented to person, place, and time. He has normal strength. GCS score is 15. GCS eye subscore is 4. GCS verbal subscore is 5. GCS motor subscore is 6.   Skin: Capillary refill takes less than 2 seconds. Rash noted. There is erythema.   Excoriated skin from Simon rash in perineal area   Psychiatric: He has a normal mood and affect. His behavior is normal. Judgment and thought content normal.       Medical Screening Exam   See Full Note    ED Course   Procedures  Labs Reviewed - No data to display       Imaging Results    None          Medications - No data to display  Medical Decision Making:   History:   Old Records Summarized: records from previous admission(s).       <> Summary of Records: Previously screened for STDs proximally 1 year ago with negative results.  Initial Assessment:   Patient presents to the ER with complaint skin irritation/lesion in the groin area.  He states he noted 1st noticed area 2 days ago after shaving.  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  He denies penile irritation or lesions.  No penile discharge or testicular pain.  Patient was in ER with his wife reports he was not recently changed sexual partners.  Wife denies skin lesions or similar complaints.  Differential Diagnosis:   Skin infection  STD  Contact dermatitis  Hidradenitis  ED Management:  After initial exam it was determined that no lab work or x-rays are needed at this time.    Patient was diagnosed with Candida rash of the groin and abrasion of the skin with infection.  Patient was given prescription for doxycycline and nystatin ointment to use as prescribed.  He was instructed to also use gold Bond powder to prevent moisture in the perineal area.  Patient verbalizes understanding and agrees with plan of care he was instructed to follow up with his primary care provider in 2 days if symptoms do not improve.  He  was given number for Meli Ogden NP in the primary care clinic to schedule appointment to establish care.                       Clinical Impression:   Final diagnoses:  [B37.89] Candida rash of groin (Primary)  [T14.8XXA, L08.9] Abrasion of skin with infection        ED Disposition Condition    Discharge Stable          ED Prescriptions       Medication Sig Dispense Start Date End Date Auth. Provider    doxycycline (VIBRAMYCIN) 100 MG Cap Take 1 capsule (100 mg total) by mouth 2 (two) times daily. for 10 days 20 capsule 6/27/2023 7/7/2023 DAVE Casaino    nystatin (MYCOSTATIN) ointment Apply topically 2 (two) times daily. 30 g 6/27/2023 -- DAVE Casiano          Follow-up Information       Follow up With Specialties Details Why Contact Info    [facility] Schedule an appointment as soon as possible for a visit in 2 days If symptoms worsen [address] [place] [address] Primary Care  [facility] [id number]  [phone]               DAVE Casiano  06/27/23 1979

## 2023-06-27 NOTE — DISCHARGE INSTRUCTIONS
Use medication as prescribed.  Use gold bond powder to prevent moisture in the perineal area.  Follow up with primary care provider if symptoms do not improve with treatment.  Return to the ER with new or worsening symptoms.

## 2023-07-11 ENCOUNTER — HOSPITAL ENCOUNTER (EMERGENCY)
Facility: HOSPITAL | Age: 23
Discharge: HOME | End: 2023-07-11
Payer: MEDICAID

## 2023-07-11 VITALS
DIASTOLIC BLOOD PRESSURE: 78 MMHG | OXYGEN SATURATION: 97 % | TEMPERATURE: 98 F | HEART RATE: 74 BPM | WEIGHT: 256 LBS | BODY MASS INDEX: 34.67 KG/M2 | HEIGHT: 72 IN | SYSTOLIC BLOOD PRESSURE: 139 MMHG | RESPIRATION RATE: 16 BRPM

## 2023-07-11 DIAGNOSIS — S39.012A LUMBAR STRAIN, INITIAL ENCOUNTER: Primary | ICD-10-CM

## 2023-07-11 DIAGNOSIS — K59.00 CONSTIPATION, UNSPECIFIED CONSTIPATION TYPE: ICD-10-CM

## 2023-07-11 PROCEDURE — 99284 EMERGENCY DEPT VISIT MOD MDM: CPT

## 2023-07-11 PROCEDURE — 63600175 PHARM REV CODE 636 W HCPCS: Performed by: NURSE PRACTITIONER

## 2023-07-11 PROCEDURE — 96372 THER/PROPH/DIAG INJ SC/IM: CPT | Performed by: NURSE PRACTITIONER

## 2023-07-11 PROCEDURE — 99284 EMERGENCY DEPT VISIT MOD MDM: CPT | Mod: ,,, | Performed by: NURSE PRACTITIONER

## 2023-07-11 PROCEDURE — 99284 PR EMERGENCY DEPT VISIT,LEVEL IV: ICD-10-PCS | Mod: ,,, | Performed by: NURSE PRACTITIONER

## 2023-07-11 RX ORDER — TIZANIDINE 4 MG/1
4 TABLET ORAL EVERY 6 HOURS PRN
Qty: 20 TABLET | Refills: 0 | Status: SHIPPED | OUTPATIENT
Start: 2023-07-11 | End: 2023-07-21

## 2023-07-11 RX ORDER — IBUPROFEN 800 MG/1
800 TABLET ORAL 3 TIMES DAILY
Qty: 20 TABLET | Refills: 0 | Status: SHIPPED | OUTPATIENT
Start: 2023-07-11

## 2023-07-11 RX ORDER — KETOROLAC TROMETHAMINE 30 MG/ML
60 INJECTION, SOLUTION INTRAMUSCULAR; INTRAVENOUS
Status: COMPLETED | OUTPATIENT
Start: 2023-07-11 | End: 2023-07-11

## 2023-07-11 RX ADMIN — KETOROLAC TROMETHAMINE 60 MG: 60 INJECTION, SOLUTION INTRAMUSCULAR at 12:07

## 2023-07-11 NOTE — Clinical Note
"Ángel Rodríguez"Chico was seen and treated in our emergency department on 7/11/2023.  He may return to work on 07/13/2023.       If you have any questions or concerns, please don't hesitate to call.      DAVE Casiano"

## 2023-07-11 NOTE — DISCHARGE INSTRUCTIONS
Take medications as prescribed.  Alternate heat and ice compresses for comfort.  Add 1 capful of MiraLax to daily medications x2 weeks.  Increase her water and fiber content in her diet.  Follow up with her primary care provider in 2 days if symptoms do not improve with treatment.  Return to the ER with new or worsening symptoms.

## 2023-07-11 NOTE — ED PROVIDER NOTES
Encounter Date: 7/11/2023       History     Chief Complaint   Patient presents with    Back Pain     Patient presents to the ER with complaint of lower back pain.  Patient reports he does a lot of heavy lifting with his job.  He states proximally 3 days ago he noticed that he was having some pain with movement and when lifting.  He reports the pain is in is lower back and radiates into bilateral flanks.  He denies dysuria.  He denies fever.  Known injury or accident.  He reports he is had similar symptoms in the past and was told that it was a pulled muscle.  He states he was taking ibuprofen and Tylenol without relief.  No loss of control of bowel or bladder function.    The history is provided by the patient. No  was used.   Review of patient's allergies indicates:  No Known Allergies  Past Medical History:   Diagnosis Date    Hypertension      No past surgical history on file.  No family history on file.  Social History     Tobacco Use    Smoking status: Every Day     Types: Cigarettes    Smokeless tobacco: Never   Substance Use Topics    Alcohol use: Never    Drug use: Never     Review of Systems   Constitutional:  Positive for activity change and fatigue.   Musculoskeletal:  Positive for back pain and myalgias.   All other systems reviewed and are negative.    Physical Exam     Initial Vitals   BP Pulse Resp Temp SpO2   07/11/23 1151 07/11/23 1149 07/11/23 1149 07/11/23 1149 07/11/23 1149   139/78 74 16 98.1 °F (36.7 °C) 97 %      MAP       --                Physical Exam    Nursing note and vitals reviewed.  Constitutional: He appears well-developed and well-nourished.   HENT:   Head: Normocephalic.   Right Ear: External ear normal.   Left Ear: External ear normal.   Nose: Nose normal.   Mouth/Throat: Oropharynx is clear and moist.   Eyes: Conjunctivae and EOM are normal. Pupils are equal, round, and reactive to light.   Neck: Neck supple.   Normal range of motion.  Cardiovascular:  Normal  rate, regular rhythm, normal heart sounds and intact distal pulses.           Pulmonary/Chest: Breath sounds normal.   Abdominal: Abdomen is soft. Bowel sounds are normal.   Musculoskeletal:         General: Tenderness present. Normal range of motion.      Cervical back: Normal range of motion and neck supple.      Comments: Lumbar muscle tender to palpation bilaterally     Neurological: He is alert and oriented to person, place, and time. He has normal strength. GCS score is 15. GCS eye subscore is 4. GCS verbal subscore is 5. GCS motor subscore is 6.   Skin: Skin is warm and dry. Capillary refill takes less than 2 seconds.   Psychiatric: He has a normal mood and affect. His behavior is normal. Judgment and thought content normal.       Medical Screening Exam   See Full Note    ED Course   Procedures  Labs Reviewed - No data to display       Imaging Results              X-Ray Lumbar Spine Ap And Lateral (Final result)  Result time 07/11/23 13:09:21      Final result by Federico Pennington DO (07/11/23 13:09:21)                   Impression:      No acute findings.  Mild to moderate colonic stool.      Electronically signed by: Federico Pennington  Date:    07/11/2023  Time:    13:09               Narrative:    EXAMINATION:  XR LUMBAR SPINE AP AND LATERAL    CLINICAL HISTORY:  lower back pain    TECHNIQUE:  XR LUMBAR SPINE AP AND LATERAL    COMPARISON:  2020    FINDINGS:  The vertebral body heights and alignment are normal.  No acute fracture or dislocation.  Mild to moderate colonic stool.                                       Medications   ketorolac injection 60 mg (60 mg Intramuscular Given 7/11/23 1250)     Medical Decision Making:   Initial Assessment:   Patient presents to the ER with complaint of lower back pain.  Patient reports he does a lot of heavy lifting with his job.  He states proximally 3 days ago he noticed that he was having some pain with movement and when lifting.  He reports the pain is in is lower  back and radiates into bilateral flanks.  He denies dysuria.  He denies fever.  Known injury or accident.  He reports he is had similar symptoms in the past and was told that it was a pulled muscle.  He states he was taking ibuprofen and Tylenol without relief.  No loss of control of bowel or bladder function.    Differential Diagnosis:   Muscle strain  Lumbar radiculopathy  UTI  Kidney stone  Clinical Tests:   Radiological Study: Ordered and Reviewed  ED Management:  Lumbar spine x-ray:  No acute abnormalities, mild to moderate colonic stool    Toradol 60 mg IM to treat lower back pain    Patient was discharged home with diagnosis of lumbar strain initial encounter and constipation.  He was instructed to take medications as prescribed he was given prescription tizanidine and ibuprofen.  He was instructed to alternate heat and ice compresses for comfort.  He was told to add 1 capful of MiraLax to daily medications and to increase his water and fiber content in his diet.  He was instructed to follow up with primary care provider in 2 days if symptoms do not improve with treatment.  Patient verbalizes understanding and agrees with plan                       Clinical Impression:   Final diagnoses:  [S39.012A] Lumbar strain, initial encounter (Primary)  [K59.00] Constipation, unspecified constipation type        ED Disposition Condition    Discharge Stable          ED Prescriptions       Medication Sig Dispense Start Date End Date Auth. Provider    tiZANidine (ZANAFLEX) 4 MG tablet Take 1 tablet (4 mg total) by mouth every 6 (six) hours as needed (Muscle spasms). 20 tablet 7/11/2023 7/21/2023 DAVE Casiano    ibuprofen (ADVIL,MOTRIN) 800 MG tablet Take 1 tablet (800 mg total) by mouth 3 (three) times daily. 20 tablet 7/11/2023 -- DAVE Casiano          Follow-up Information       Follow up With Specialties Details Why Contact Info    Primary care provider  Schedule an appointment as soon as possible for a  visit in 2 days If symptoms worsen              Barbara Clark, P  07/11/23 0181

## 2023-08-14 ENCOUNTER — HOSPITAL ENCOUNTER (EMERGENCY)
Facility: HOSPITAL | Age: 23
Discharge: HOME | End: 2023-08-14
Payer: MEDICAID

## 2023-08-14 VITALS
HEART RATE: 67 BPM | WEIGHT: 255 LBS | BODY MASS INDEX: 34.54 KG/M2 | OXYGEN SATURATION: 98 % | SYSTOLIC BLOOD PRESSURE: 135 MMHG | HEIGHT: 72 IN | TEMPERATURE: 98 F | RESPIRATION RATE: 16 BRPM | DIASTOLIC BLOOD PRESSURE: 84 MMHG

## 2023-08-14 DIAGNOSIS — L91.8 SKIN TAG: Primary | ICD-10-CM

## 2023-08-14 PROCEDURE — 99283 PR EMERGENCY DEPT VISIT,LEVEL III: ICD-10-PCS | Mod: ,,, | Performed by: NURSE PRACTITIONER

## 2023-08-14 PROCEDURE — 99283 EMERGENCY DEPT VISIT LOW MDM: CPT | Mod: ,,, | Performed by: NURSE PRACTITIONER

## 2023-08-14 PROCEDURE — 99281 EMR DPT VST MAYX REQ PHY/QHP: CPT

## 2023-08-14 NOTE — ED PROVIDER NOTES
Encounter Date: 8/14/2023       History     Chief Complaint   Patient presents with    Rash     Irritation under arms after using a Deoderant.      22-year-old male presents to the emergency department to be evaluated for a rash on his left armpit as well as his groin area.  He states that he recently changed deodorant.  He has been applying the deodorant to his groin area as well.    The history is provided by the patient.   Rash   The current episode started two days ago. Pertinent negatives include no blisters, no itching, no pain and no weeping.     Review of patient's allergies indicates:  No Known Allergies  Past Medical History:   Diagnosis Date    Hypertension      No past surgical history on file.  No family history on file.  Social History     Tobacco Use    Smoking status: Every Day     Current packs/day: 0.00     Types: Cigarettes    Smokeless tobacco: Never   Substance Use Topics    Alcohol use: Never    Drug use: Never     Review of Systems   Constitutional:  Negative for chills.   Respiratory:  Negative for shortness of breath.    Cardiovascular:  Negative for chest pain.   Skin:  Positive for rash. Negative for itching.   All other systems reviewed and are negative.      Physical Exam     Initial Vitals   BP Pulse Resp Temp SpO2   -- -- -- -- --      MAP       --         Physical Exam    Vitals reviewed.  Constitutional: He appears well-developed and well-nourished.   Neck: Neck supple.   Cardiovascular:  Normal rate and regular rhythm.           Pulmonary/Chest: Breath sounds normal.   Abdominal: Abdomen is soft. Bowel sounds are normal. He exhibits no distension and no mass. There is no abdominal tenderness. There is no rebound and no guarding.   Musculoskeletal:         General: Normal range of motion.      Cervical back: Neck supple.     Neurological: He is alert and oriented to person, place, and time. He has normal strength. GCS score is 15. GCS eye subscore is 4. GCS verbal subscore is 5. GCS  motor subscore is 6.   Skin: Skin is warm and dry. Capillary refill takes less than 2 seconds.   Skin tag to the groin area as well as the left axilla   Psychiatric: He has a normal mood and affect.         Medical Screening Exam   See Full Note    ED Course   Procedures  Labs Reviewed - No data to display       Imaging Results    None          Medications - No data to display  Medical Decision Makin-year-old male presents to the emergency department to be evaluated for a rash on his left armpit as well as his groin area.  He states that he recently changed deodorant.  He has been applying the deodorant to his groin area as well.  Diagnosis:  Skin tag                         Clinical Impression:   Final diagnoses:  [L91.8] Skin tag (Primary)        ED Disposition Condition    Discharge Stable          ED Prescriptions    None       Follow-up Information    None          Tania Mart, ATUL  [phone]

## 2023-08-14 NOTE — DISCHARGE INSTRUCTIONS
Stop using the new deodorant and give it to the deodorant you were using before.Follow up with your primary care provider in 2 days. Return to the emergency department for any increase in symptoms or for any other new or worrisome symptoms.      Epidermal Closure Graft Donor Site (Optional): simple interrupted

## 2023-11-14 ENCOUNTER — CLINICAL SUPPORT (OUTPATIENT)
Dept: PRIMARY CARE CLINIC | Facility: CLINIC | Age: 23
End: 2023-11-14

## 2023-11-14 DIAGNOSIS — Z02.4 ENCOUNTER FOR DEPARTMENT OF TRANSPORTATION (DOT) EXAMINATION FOR DRIVING LICENSE RENEWAL: Primary | ICD-10-CM

## 2023-11-14 PROCEDURE — 99499 PR PHYSICAL - DOT/CDL: ICD-10-PCS | Mod: ,,, | Performed by: NURSE PRACTITIONER

## 2023-11-14 PROCEDURE — 99499 UNLISTED E&M SERVICE: CPT | Mod: ,,, | Performed by: NURSE PRACTITIONER

## 2023-11-14 NOTE — PROGRESS NOTES
Subjective     Patient ID: Ángel Golden is a 22 y.o. male.    Chief Complaint: No chief complaint on file.    HPI  Review of Systems       Objective     Physical Exam       Assessment and Plan     1. Encounter for Department of Transportation (DOT) examination for driving license renewal        See scanned documents in .            No follow-ups on file.

## 2023-12-28 ENCOUNTER — HOSPITAL ENCOUNTER (EMERGENCY)
Facility: HOSPITAL | Age: 23
Discharge: HOME | End: 2023-12-28
Payer: MEDICAID

## 2023-12-28 VITALS
WEIGHT: 243 LBS | BODY MASS INDEX: 34.02 KG/M2 | SYSTOLIC BLOOD PRESSURE: 146 MMHG | OXYGEN SATURATION: 98 % | DIASTOLIC BLOOD PRESSURE: 83 MMHG | TEMPERATURE: 99 F | RESPIRATION RATE: 18 BRPM | HEIGHT: 71 IN | HEART RATE: 85 BPM

## 2023-12-28 DIAGNOSIS — B34.9 VIRAL SYNDROME: Primary | ICD-10-CM

## 2023-12-28 DIAGNOSIS — L73.9 FOLLICULITIS: ICD-10-CM

## 2023-12-28 LAB
FLUAV AG UPPER RESP QL IA.RAPID: NEGATIVE
FLUBV AG UPPER RESP QL IA.RAPID: NEGATIVE
SARS-COV-2 RDRP RESP QL NAA+PROBE: NEGATIVE

## 2023-12-28 PROCEDURE — 87635 SARS-COV-2 COVID-19 AMP PRB: CPT | Performed by: NURSE PRACTITIONER

## 2023-12-28 PROCEDURE — 10060 I&D ABSCESS SIMPLE/SINGLE: CPT

## 2023-12-28 PROCEDURE — 99284 EMERGENCY DEPT VISIT MOD MDM: CPT | Mod: 25,,, | Performed by: NURSE PRACTITIONER

## 2023-12-28 PROCEDURE — 10060 I&D ABSCESS SIMPLE/SINGLE: CPT | Mod: ,,, | Performed by: NURSE PRACTITIONER

## 2023-12-28 PROCEDURE — 87804 INFLUENZA ASSAY W/OPTIC: CPT | Performed by: NURSE PRACTITIONER

## 2023-12-28 PROCEDURE — 99283 EMERGENCY DEPT VISIT LOW MDM: CPT | Mod: 25

## 2023-12-28 RX ORDER — SULFAMETHOXAZOLE AND TRIMETHOPRIM 800; 160 MG/1; MG/1
1 TABLET ORAL 2 TIMES DAILY
Qty: 14 TABLET | Refills: 0 | Status: SHIPPED | OUTPATIENT
Start: 2023-12-28 | End: 2024-01-04

## 2023-12-28 NOTE — Clinical Note
"Ángel Velazquezolivia Golden was seen and treated in our emergency department on 12/28/2023.  He may return to work on 01/01/2024.       If you have any questions or concerns, please don't hesitate to call.      Elle Ruth, EVERTP"

## 2023-12-29 NOTE — ED PROVIDER NOTES
Encounter Date: 12/28/2023       History     Chief Complaint   Patient presents with    Nasal Congestion     23-year-old male presents to ED with complaint of nasal congestion, headache, dizziness, and congestion.  Patient reports symptoms started on yesterday with worsening on today.  Patient also reports shaving 3-4 days ago with tender nodule to right side of pubic area that he has been expressing on his own.  Denies fever, chills, chest pain, shortness of breath    The history is provided by the patient.     Review of patient's allergies indicates:  No Known Allergies  Past Medical History:   Diagnosis Date    Hypertension      No past surgical history on file.  No family history on file.  Social History     Tobacco Use    Smoking status: Every Day     Types: Cigarettes    Smokeless tobacco: Never   Substance Use Topics    Alcohol use: Never    Drug use: Never     Review of Systems   Constitutional:  Negative for chills and fever.   HENT:  Positive for congestion. Negative for sinus pressure and sinus pain.    Eyes:  Negative for photophobia and visual disturbance.   Respiratory:  Positive for cough. Negative for shortness of breath.    Cardiovascular:  Negative for chest pain and palpitations.   Gastrointestinal:  Negative for nausea and vomiting.   Genitourinary:  Negative for dysuria and urgency.   Musculoskeletal:  Negative for arthralgias and gait problem.   Skin:  Positive for wound. Negative for color change.   Neurological:  Negative for dizziness and weakness.   Hematological:  Negative for adenopathy. Does not bruise/bleed easily.   Psychiatric/Behavioral:  Negative for agitation and confusion.    All other systems reviewed and are negative.      Physical Exam     Initial Vitals [12/28/23 2104]   BP Pulse Resp Temp SpO2   (!) 146/83 85 18 98.6 °F (37 °C) 98 %      MAP       --         Physical Exam    Nursing note and vitals reviewed.  Constitutional: He appears well-developed and well-nourished.    HENT:   Head: Normocephalic and atraumatic.   Eyes: EOM are normal. Pupils are equal, round, and reactive to light.   Neck: Neck supple.   Normal range of motion.  Cardiovascular:  Normal rate and regular rhythm.           Pulmonary/Chest: He has no wheezes. He has no rhonchi.   Abdominal: He exhibits no distension. There is no abdominal tenderness.   Genitourinary:       Musculoskeletal:         General: No tenderness or edema.      Cervical back: Normal range of motion and neck supple.     Lymphadenopathy:     He has no cervical adenopathy.   Neurological: He is alert and oriented to person, place, and time. No cranial nerve deficit or sensory deficit.   Skin: Skin is warm and dry. Capillary refill takes less than 2 seconds. Abscess noted.   Psychiatric: He has a normal mood and affect. Thought content normal.         Medical Screening Exam   See Full Note    ED Course   I & D - Incision and Drainage    Date/Time: 2023 10:30 PM  Location procedure was performed: [facility] EMERGENCY DEPARTMENT    Performed by: Elle Ruth FNP  Authorized by: Elle Ruth FNP  Consent Done: Yes  Consent: Verbal consent obtained.  Risks and benefits: risks, benefits and alternatives were discussed  Consent given by: patient  Patient understanding: patient states understanding of the procedure being performed  Patient identity confirmed: verbally with patient,  and name  Type: abscess  Body area: trunk  Risk factor: underlying major vessel  Scalpel size: 11  Incision type: single straight  Incision depth: dermal  Complexity: simple  Drainage: serosanguinous and pus  Drainage amount: scant  Wound treatment: incision, drainage and expression of material    Incision depth: dermal        Labs Reviewed   RAPID INFLUENZA A/B - Normal   SARS-COV-2 RNA AMPLIFICATION, QUAL - Normal    Narrative:     Negative SARS-CoV results should not be used as the sole basis for treatment or patient management decisions; negative  results should be considered in the context of a patient's recent exposures, history and the presene of clinical signs and symptoms consistent with COVID-19.  Negative results should be treated as presumptive and confirmed by molecular assay, if necessary for patient management.          Imaging Results    None          Medications - No data to display  Medical Decision Making  23-year-old male presents to ED with complaint of nasal congestion, headache, dizziness, and congestion.  Patient reports symptoms started on yesterday with worsening on today.  Patient also reports shaving 3-4 days ago with tender nodule to right side of pubic area that he has been expressing on his own.  Denies fever, chills, chest pain, shortness of breath    Labs obtained. I&D performed. Prescription provided    Risk  Prescription drug management.                                      Clinical Impression:   Final diagnoses:  [B34.9] Viral syndrome (Primary)  [L73.9] Folliculitis        ED Disposition Condition    Discharge Stable          ED Prescriptions       Medication Sig Dispense Start Date End Date Auth. Provider    sulfamethoxazole-trimethoprim 800-160mg (BACTRIM DS) 800-160 mg Tab () Take 1 tablet by mouth 2 (two) times daily. for 7 days 14 tablet 2023 Elle Ruth, DAVE          Follow-up Information    None          Elle Ruth, DAVE  [phone]

## 2024-01-18 ENCOUNTER — HOSPITAL ENCOUNTER (EMERGENCY)
Facility: HOSPITAL | Age: 24
Discharge: HOME | End: 2024-01-18
Payer: COMMERCIAL

## 2024-01-18 VITALS
HEIGHT: 71 IN | TEMPERATURE: 99 F | SYSTOLIC BLOOD PRESSURE: 133 MMHG | OXYGEN SATURATION: 97 % | RESPIRATION RATE: 17 BRPM | HEART RATE: 86 BPM | DIASTOLIC BLOOD PRESSURE: 73 MMHG | WEIGHT: 261 LBS | BODY MASS INDEX: 36.54 KG/M2

## 2024-01-18 DIAGNOSIS — R35.0 URINARY FREQUENCY: Primary | ICD-10-CM

## 2024-01-18 LAB
BILIRUB UR QL STRIP: NEGATIVE
CLARITY UR: CLEAR
COLOR UR: NORMAL
GLUCOSE UR STRIP-MCNC: NORMAL MG/DL
KETONES UR STRIP-SCNC: NEGATIVE MG/DL
LEUKOCYTE ESTERASE UR QL STRIP: NEGATIVE
NITRITE UR QL STRIP: NEGATIVE
PH UR STRIP: 6 PH UNITS
PROT UR QL STRIP: NEGATIVE
RBC # UR STRIP: NEGATIVE /UL
SP GR UR STRIP: 1.02
UROBILINOGEN UR STRIP-ACNC: NORMAL MG/DL

## 2024-01-18 PROCEDURE — 99282 EMERGENCY DEPT VISIT SF MDM: CPT

## 2024-01-18 PROCEDURE — 81003 URINALYSIS AUTO W/O SCOPE: CPT | Performed by: NURSE PRACTITIONER

## 2024-01-18 PROCEDURE — 99283 EMERGENCY DEPT VISIT LOW MDM: CPT | Mod: ,,, | Performed by: NURSE PRACTITIONER

## 2024-01-18 NOTE — ED PROVIDER NOTES
Encounter Date: 1/18/2024       History     Chief Complaint   Patient presents with    Urinary Frequency     X 2 days      Patient presents to the ER with complaint of urinary frequency.  He denies dysuria or hematuria.  He denies fever chills or body aches.  Denies recent change in sexual partners.  No lesions or discharge on/from his penis.  No testicular pain or edema.  Patient has history of hypertension.    The history is provided by the patient. No  was used.     Review of patient's allergies indicates:  No Known Allergies  Past Medical History:   Diagnosis Date    Hypertension      History reviewed. No pertinent surgical history.  History reviewed. No pertinent family history.  Social History     Tobacco Use    Smoking status: Every Day     Types: Cigarettes    Smokeless tobacco: Never   Substance Use Topics    Alcohol use: Never    Drug use: Never     Review of Systems   Constitutional:  Positive for activity change.   Gastrointestinal:  Negative for abdominal pain, diarrhea, nausea and vomiting.   Endocrine: Positive for polyuria.   Genitourinary:  Negative for penile discharge, penile pain and scrotal swelling.   All other systems reviewed and are negative.      Physical Exam     Initial Vitals [01/18/24 1617]   BP Pulse Resp Temp SpO2   133/73 86 17 98.5 °F (36.9 °C) 97 %      MAP       --         Physical Exam    Nursing note and vitals reviewed.  Constitutional: He appears well-developed and well-nourished.   HENT:   Head: Normocephalic.   Nose: Nose normal.   Mouth/Throat: Oropharynx is clear and moist.   Eyes: EOM are normal.   Neck:   Normal range of motion.  Cardiovascular:  Normal rate, normal heart sounds and intact distal pulses.           Pulmonary/Chest: Breath sounds normal.   Abdominal: Abdomen is soft. Bowel sounds are normal.   Musculoskeletal:         General: Normal range of motion.      Cervical back: Normal range of motion.     Neurological: He is alert and oriented  to person, place, and time. He has normal strength. GCS score is 15. GCS eye subscore is 4. GCS verbal subscore is 5. GCS motor subscore is 6.   Skin: Skin is warm and dry. Capillary refill takes less than 2 seconds.   Psychiatric: He has a normal mood and affect.         Medical Screening Exam   See Full Note    ED Course   Procedures  Labs Reviewed   URINALYSIS, REFLEX TO URINE CULTURE          Imaging Results    None          Medications - No data to display  Medical Decision Making  Patient presents to the ER with complaint of urinary frequency.  He denies dysuria or hematuria.  He denies fever chills or body aches.  Denies recent change in sexual partners.  No lesions or discharge on/from his penis.  No testicular pain or edema.  Patient has history of hypertension.      Amount and/or Complexity of Data Reviewed  Labs: ordered. Decision-making details documented in ED Course.  Discussion of management or test interpretation with external provider(s): Patient was discharged home with diagnosis of urinary frequency.  He was turned encouraged to increase his fluid intake to keep hydrated.  He was told to decrease sugary drinks and caffeinated drinks.  He was told to return to the ER with new or worsening symptoms otherwise follow up with the primary care provider if symptoms do not improve.                                      Clinical Impression:   Final diagnoses:  [R35.0] Urinary frequency (Primary)        ED Disposition Condition    Discharge Stable          ED Prescriptions    None       Follow-up Information       Follow up With Specialties Details Why Contact Info    Primary Care [facility]  Schedule an appointment as soon as possible for a visit in 2 days If symptoms worsen              Barbara Clark, DAVE  01/18/24 5751

## 2024-01-18 NOTE — Clinical Note
"Ángel Rodríguez" [place] was seen and treated in our emergency department on 1/18/2024.  He may return to work on 01/19/2024.       If you have any questions or concerns, please don't hesitate to call.      Barbara Clark, FNP"

## 2024-01-18 NOTE — DISCHARGE INSTRUCTIONS
Primary care provider in 2 days if symptoms do not improve.  Increase fluid intake to keep hydrated.  Return to the ER with new or worsening symptoms.

## 2024-10-23 ENCOUNTER — HOSPITAL ENCOUNTER (EMERGENCY)
Facility: HOSPITAL | Age: 24
Discharge: HOME | End: 2024-10-23
Payer: MEDICAID

## 2024-10-23 VITALS
TEMPERATURE: 98 F | WEIGHT: 266 LBS | DIASTOLIC BLOOD PRESSURE: 87 MMHG | BODY MASS INDEX: 37.24 KG/M2 | RESPIRATION RATE: 19 BRPM | HEIGHT: 71 IN | SYSTOLIC BLOOD PRESSURE: 142 MMHG | OXYGEN SATURATION: 99 % | HEART RATE: 83 BPM

## 2024-10-23 DIAGNOSIS — R51.9 SINUS HEADACHE: ICD-10-CM

## 2024-10-23 DIAGNOSIS — J06.9 VIRAL URI: Primary | ICD-10-CM

## 2024-10-23 PROCEDURE — 99284 EMERGENCY DEPT VISIT MOD MDM: CPT | Mod: 25

## 2024-10-23 PROCEDURE — 63600175 PHARM REV CODE 636 W HCPCS: Performed by: NURSE PRACTITIONER

## 2024-10-23 PROCEDURE — 96372 THER/PROPH/DIAG INJ SC/IM: CPT | Performed by: NURSE PRACTITIONER

## 2024-10-23 PROCEDURE — 25000003 PHARM REV CODE 250: Performed by: NURSE PRACTITIONER

## 2024-10-23 RX ORDER — KETOROLAC TROMETHAMINE 30 MG/ML
30 INJECTION, SOLUTION INTRAMUSCULAR; INTRAVENOUS
Status: COMPLETED | OUTPATIENT
Start: 2024-10-23 | End: 2024-10-23

## 2024-10-23 RX ORDER — ONDANSETRON 4 MG/1
4 TABLET, ORALLY DISINTEGRATING ORAL
Status: COMPLETED | OUTPATIENT
Start: 2024-10-23 | End: 2024-10-23

## 2024-10-23 RX ORDER — FLUTICASONE PROPIONATE 50 MCG
1 SPRAY, SUSPENSION (ML) NASAL 2 TIMES DAILY PRN
Qty: 15 G | Refills: 0 | Status: SHIPPED | OUTPATIENT
Start: 2024-10-23

## 2024-10-23 RX ORDER — KETOROLAC TROMETHAMINE 10 MG/1
10 TABLET, FILM COATED ORAL EVERY 6 HOURS PRN
Qty: 20 TABLET | Refills: 0 | Status: SHIPPED | OUTPATIENT
Start: 2024-10-23 | End: 2024-10-28

## 2024-10-23 RX ORDER — CHLORPHENIRAMINE MALEATE AND PHENYLEPHRINE HYDROCHLORIDE 4; 10 MG/1; MG/1
1 TABLET, COATED ORAL EVERY 6 HOURS PRN
Qty: 20 TABLET | Refills: 0 | Status: SHIPPED | OUTPATIENT
Start: 2024-10-23 | End: 2024-11-02

## 2024-10-23 RX ADMIN — ONDANSETRON 4 MG: 4 TABLET, ORALLY DISINTEGRATING ORAL at 09:10

## 2024-10-23 RX ADMIN — KETOROLAC TROMETHAMINE 30 MG: 30 INJECTION, SOLUTION INTRAMUSCULAR at 09:10

## 2024-12-23 ENCOUNTER — HOSPITAL ENCOUNTER (EMERGENCY)
Facility: HOSPITAL | Age: 24
Discharge: HOME | End: 2024-12-23
Payer: COMMERCIAL

## 2024-12-23 VITALS
BODY MASS INDEX: 36.4 KG/M2 | SYSTOLIC BLOOD PRESSURE: 132 MMHG | TEMPERATURE: 99 F | DIASTOLIC BLOOD PRESSURE: 85 MMHG | RESPIRATION RATE: 16 BRPM | WEIGHT: 260 LBS | OXYGEN SATURATION: 98 % | HEIGHT: 71 IN | HEART RATE: 70 BPM

## 2024-12-23 DIAGNOSIS — R55 VASOVAGAL REACTION: Primary | ICD-10-CM

## 2024-12-23 DIAGNOSIS — F43.9 STRESS: ICD-10-CM

## 2024-12-23 PROCEDURE — 99283 EMERGENCY DEPT VISIT LOW MDM: CPT

## 2024-12-23 PROCEDURE — 25000003 PHARM REV CODE 250: Performed by: NURSE PRACTITIONER

## 2024-12-23 RX ORDER — HYDROXYZINE PAMOATE 25 MG/1
50 CAPSULE ORAL
Status: COMPLETED | OUTPATIENT
Start: 2024-12-23 | End: 2024-12-23

## 2024-12-23 RX ADMIN — HYDROXYZINE PAMOATE 50 MG: 25 CAPSULE ORAL at 10:12

## 2024-12-23 NOTE — Clinical Note
Hiwot Bhakta accompanied their child to the emergency department on 12/23/2024. They may return to work on 12/24/2024.      If you have any questions or concerns, please don't hesitate to call.      Dhara Gastelum RN

## 2024-12-23 NOTE — Clinical Note
"Ángel Rodríguez" [place] was seen and treated in our emergency department on 12/23/2024.  He may return to work on 12/24/2024.       If you have any questions or concerns, please don't hesitate to call.      Dhara Gastelum RN    "

## 2024-12-24 ENCOUNTER — TELEPHONE (OUTPATIENT)
Dept: EMERGENCY MEDICINE | Facility: HOSPITAL | Age: 24
End: 2024-12-24
Payer: COMMERCIAL

## 2024-12-24 NOTE — DISCHARGE INSTRUCTIONS
Drink plenty of fluids. Eat a healthy, well balanced diet. Get plenty of rest. Follow up with your primary care provider within the next week for recheck and continued care and management. Return to the ED for worsening signs and symptoms or otherwise as needed.

## 2024-12-24 NOTE — ED PROVIDER NOTES
"Encounter Date: 12/23/2024       History     Chief Complaint   Patient presents with    Loss of Consciousness     Pt presents to ed with c/o having s syncopal episode while someone was digging a splinter out of his finger while at work      23 y/o AAM presents to the emergency department with c/o having a syncopal episode at work today while someone was trying to remove a splinter from his hand. He states there were several people with "sharp objects" poking and trying to remove a splinter when he became lightheaded and passed out. He states he has been in his usual state of health up until then other than some increased stress. He denies chest pain, shortness of breath, palpitations, diaphoresis, nausea, vomiting. He also states he has not eaten anything today and only had water to drink.     The history is provided by the patient.     Review of patient's allergies indicates:  No Known Allergies  Past Medical History:   Diagnosis Date    Hypertension      History reviewed. No pertinent surgical history.  No family history on file.  Social History     Tobacco Use    Smoking status: Every Day     Types: Cigarettes    Smokeless tobacco: Never   Substance Use Topics    Alcohol use: Never    Drug use: Never     Review of Systems   All other systems reviewed and are negative.      Physical Exam     Initial Vitals [12/23/24 2133]   BP Pulse Resp Temp SpO2   122/65 88 16 98.5 °F (36.9 °C) 97 %      MAP       --         Physical Exam    Constitutional: He appears well-developed and well-nourished. He is cooperative.  Non-toxic appearance.   BMI >30   Cardiovascular:  Normal rate, regular rhythm and normal heart sounds.           Pulmonary/Chest: Effort normal and breath sounds normal.     Neurological: He is alert and oriented to person, place, and time. He has normal strength. GCS eye subscore is 4. GCS verbal subscore is 5. GCS motor subscore is 6.   Skin: Skin is warm, dry and intact. Capillary refill takes less than 2 " "seconds.         Medical Screening Exam   See Full Note    ED Course   Procedures  Labs Reviewed - No data to display       Imaging Results    None          Medications   hydrOXYzine pamoate capsule 50 mg (50 mg Oral Given 12/23/24 7231)     Medical Decision Making  25 y/o VADIM presents to the emergency department with c/o having a syncopal episode at work today while someone was trying to remove a splinter from his hand. He states there were several people with "sharp objects" poking and trying to remove a splinter when he became lightheaded and passed out. He states he has been in his usual state of health up until then other than some increased stress. He denies chest pain, shortness of breath, palpitations, diaphoresis, nausea, vomiting. He also states he has not eaten anything today and only had water to drink.     Problems Addressed:  Stress:     Details: Vistaril given. Counseled on supportive measures. Follow up instructions given. Warning s/s discussed and return precautions given; the patient has v/u.    Vasovagal reaction:     Details: Counseled on supportive measures. Strict f/u instructions given. Warning s/s discussed and return precautions given; the patient has v/u.      Risk  OTC drugs.  Prescription drug management.                                      Clinical Impression:   Final diagnoses:  [R55] Vasovagal reaction (Primary)  [F43.9] Stress        ED Disposition Condition    Discharge Stable          ED Prescriptions    None       Follow-up Information       Follow up With Specialties Details Why Contact Info    Primary Care Provider  In 1 week               Carissa Barros FNP  12/23/24 1211    "

## 2025-03-19 ENCOUNTER — HOSPITAL ENCOUNTER (EMERGENCY)
Facility: HOSPITAL | Age: 25
Discharge: HOME | End: 2025-03-19
Payer: COMMERCIAL

## 2025-03-19 VITALS
HEART RATE: 64 BPM | OXYGEN SATURATION: 97 % | BODY MASS INDEX: 33.6 KG/M2 | HEIGHT: 71 IN | DIASTOLIC BLOOD PRESSURE: 88 MMHG | TEMPERATURE: 99 F | WEIGHT: 240 LBS | SYSTOLIC BLOOD PRESSURE: 131 MMHG | RESPIRATION RATE: 16 BRPM

## 2025-03-19 DIAGNOSIS — S60.352A FOREIGN BODY OF LEFT THUMB, INITIAL ENCOUNTER: Primary | ICD-10-CM

## 2025-03-19 PROCEDURE — 99284 EMERGENCY DEPT VISIT MOD MDM: CPT | Mod: 25

## 2025-03-19 PROCEDURE — 90471 IMMUNIZATION ADMIN: CPT | Performed by: NURSE PRACTITIONER

## 2025-03-19 PROCEDURE — 90715 TDAP VACCINE 7 YRS/> IM: CPT | Performed by: NURSE PRACTITIONER

## 2025-03-19 PROCEDURE — 63600175 PHARM REV CODE 636 W HCPCS: Performed by: NURSE PRACTITIONER

## 2025-03-19 PROCEDURE — 10060 I&D ABSCESS SIMPLE/SINGLE: CPT

## 2025-03-19 PROCEDURE — 10120 INC&RMVL FB SUBQ TISS SMPL: CPT

## 2025-03-19 RX ORDER — LIDOCAINE HYDROCHLORIDE 10 MG/ML
10 INJECTION, SOLUTION INFILTRATION; PERINEURAL
Status: COMPLETED | OUTPATIENT
Start: 2025-03-19 | End: 2025-03-19

## 2025-03-19 RX ORDER — SULFAMETHOXAZOLE AND TRIMETHOPRIM 800; 160 MG/1; MG/1
1 TABLET ORAL 2 TIMES DAILY
Qty: 20 TABLET | Refills: 0 | Status: SHIPPED | OUTPATIENT
Start: 2025-03-19 | End: 2025-03-29

## 2025-03-19 RX ADMIN — LIDOCAINE HYDROCHLORIDE 10 ML: 10 INJECTION, SOLUTION INFILTRATION; PERINEURAL at 09:03

## 2025-03-19 RX ADMIN — CLOSTRIDIUM TETANI TOXOID ANTIGEN (FORMALDEHYDE INACTIVATED), CORYNEBACTERIUM DIPHTHERIAE TOXOID ANTIGEN (FORMALDEHYDE INACTIVATED), BORDETELLA PERTUSSIS TOXOID ANTIGEN (GLUTARALDEHYDE INACTIVATED), BORDETELLA PERTUSSIS FILAMENTOUS HEMAGGLUTININ ANTIGEN (FORMALDEHYDE INACTIVATED), BORDETELLA PERTUSSIS PERTACTIN ANTIGEN, AND BORDETELLA PERTUSSIS FIMBRIAE 2/3 ANTIGEN 0.5 ML: 5; 2; 2.5; 5; 3; 5 INJECTION, SUSPENSION INTRAMUSCULAR at 09:03

## 2025-03-19 NOTE — ED PROVIDER NOTES
Encounter Date: 3/19/2025       History     Chief Complaint   Patient presents with    Foreign Body in Skin     C/o splinter in left thumb since last night    Otalgia     C/o right ear pain       24-year-old male presents to the emergency department to be evaluated for a splinter in his left thumb as well as swelling to his right earlobe.  He said that he got a splinter in his left thumb a few days ago while lifting a piece of wood.    The history is provided by the patient.     Review of patient's allergies indicates:  No Known Allergies  Past Medical History:   Diagnosis Date    Hypertension      History reviewed. No pertinent surgical history.  No family history on file.  Social History[1]  Review of Systems   Constitutional:  Negative for chills and fever.   All other systems reviewed and are negative.      Physical Exam     Initial Vitals [03/19/25 0938]   BP Pulse Resp Temp SpO2   131/88 64 16 98.6 °F (37 °C) 97 %      MAP       --         Physical Exam    Vitals reviewed.  Constitutional: He appears well-developed and well-nourished.   HENT:   Ears:    Neck: Neck supple.   Cardiovascular:  Normal rate and regular rhythm.           Pulmonary/Chest: Breath sounds normal.   Musculoskeletal:         General: Normal range of motion.      Cervical back: Neck supple.     Neurological: He is alert and oriented to person, place, and time. He has normal strength. GCS score is 15. GCS eye subscore is 4. GCS verbal subscore is 5. GCS motor subscore is 6.   Skin: Skin is warm and dry. Capillary refill takes less than 2 seconds.   Psychiatric: He has a normal mood and affect.         Medical Screening Exam   See Full Note    ED Course   I & D - Incision and Drainage    Date/Time: 3/19/2025 10:27 AM  Location procedure was performed: [facility] EMERGENCY DEPARTMENT    Performed by: Tania Mart FNP  Authorized by: Tania Mart FNP  Pre-operative diagnosis: Abscess  Post-operative diagnosis: Abscess  Consent  Done: Yes  Consent: Verbal consent obtained. Written consent obtained  Consent given by: patient  Type: abscess  Scalpel size: 18 gauge needle.  Complexity: simple  Drainage: pus  Drainage amount: copious  Wound treatment: incision and drainage  Complications: No  Estimated blood loss (mL): 0  Specimens: No  Implants: No  Patient tolerance: Patient tolerated the procedure well with no immediate complications      Foreign Body    Date/Time: 3/19/2025 10:28 AM    Performed by: Tania Mart FNP  Authorized by: Tania Mart FNP  Consent Done: Yes  Consent: Written consent obtained  Consent given by: patient  Body area: skin  Anesthesia: local infiltration    Anesthesia:  Local Anesthetic: lidocaine 1% without epinephrine  Anesthetic total: 2 mL    Patient sedated: no  Tendon involvement: none  Complexity: simple  0 objects recovered.  Post-procedure assessment: foreign body not removed  Patient tolerance: Patient tolerated the procedure well with no immediate complications  Comments: Incision made with 11 blade scalpel.  No foreign body visualized      Labs Reviewed - No data to display       Imaging Results    None          Medications   LIDOcaine HCL 10 mg/ml (1%) injection 10 mL (10 mLs Other Given by Provider 3/19/25 0947)   Tdap vaccine injection 0.5 mL (0.5 mLs Intramuscular Given 3/19/25 0947)     Medical Decision Making  24-year-old male presents to the emergency department to be evaluated for a splinter in his left thumb as well as swelling to his right earlobe.  He said that he got a splinter in his left thumb a few days ago while lifting a piece of wood.  Abscess to the right earlobe drained with an 18 gauge needle.  Large amount of purulent drainage obtained  Dr. Henning evaluated the patient and attempted to remove a foreign body.  There was no foreign body visualized.  Diagnosis:  Abscess right earlobe, possible foreign body  Prescribed Bactrim    Risk  Prescription drug management.                                       Clinical Impression:   Final diagnoses:  [S60.352A] Foreign body of left thumb, initial encounter (Primary)        ED Disposition Condition    Discharge Stable          ED Prescriptions       Medication Sig Dispense Start Date End Date Auth. Provider    sulfamethoxazole-trimethoprim 800-160mg (BACTRIM DS) 800-160 mg Tab Take 1 tablet by mouth 2 (two) times daily. for 10 days 20 tablet 3/19/2025 3/29/2025 Tania Mart FNP          Follow-up Information    None          Tania Mart FNP  03/19/25 1029         [1]   Social History  Tobacco Use    Smoking status: Every Day     Types: Cigarettes    Smokeless tobacco: Never   Substance Use Topics    Alcohol use: Never    Drug use: Never        Tania Mart FNP  03/19/25 1030

## 2025-03-19 NOTE — DISCHARGE INSTRUCTIONS
Take antibiotics as directed.  Follow up with your primary care provider in 2 days. Return to the emergency department for any increase in symptoms or for any other new or worrisome symptoms.

## 2025-07-13 ENCOUNTER — HOSPITAL ENCOUNTER (EMERGENCY)
Facility: HOSPITAL | Age: 25
Discharge: HOME | End: 2025-07-13
Payer: COMMERCIAL

## 2025-07-13 VITALS
WEIGHT: 226 LBS | RESPIRATION RATE: 18 BRPM | BODY MASS INDEX: 31.64 KG/M2 | SYSTOLIC BLOOD PRESSURE: 124 MMHG | DIASTOLIC BLOOD PRESSURE: 72 MMHG | HEIGHT: 71 IN | TEMPERATURE: 99 F | OXYGEN SATURATION: 99 % | HEART RATE: 72 BPM

## 2025-07-13 DIAGNOSIS — S39.012A BACK STRAIN, INITIAL ENCOUNTER: Primary | ICD-10-CM
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
## 2025-07-13 PROCEDURE — 96372 THER/PROPH/DIAG INJ SC/IM: CPT | Performed by: NURSE PRACTITIONER

## 2025-07-13 PROCEDURE — 99284 EMERGENCY DEPT VISIT MOD MDM: CPT | Mod: 25

## 2025-07-13 PROCEDURE — 63600175 PHARM REV CODE 636 W HCPCS: Mod: JZ,TB | Performed by: NURSE PRACTITIONER

## 2025-07-13 RX ORDER — CYCLOBENZAPRINE HCL 10 MG
10 TABLET ORAL 3 TIMES DAILY PRN
Qty: 15 TABLET | Refills: 0 | Status: SHIPPED | OUTPATIENT
Start: 2025-07-13 | End: 2025-07-18

## 2025-07-13 RX ORDER — KETOROLAC TROMETHAMINE 30 MG/ML
60 INJECTION, SOLUTION INTRAMUSCULAR; INTRAVENOUS
Status: COMPLETED | OUTPATIENT
Start: 2025-07-13 | End: 2025-07-13

## 2025-07-13 RX ORDER — IBUPROFEN 800 MG/1
800 TABLET, FILM COATED ORAL EVERY 6 HOURS PRN
Qty: 20 TABLET | Refills: 0 | Status: SHIPPED | OUTPATIENT
Start: 2025-07-13

## 2025-07-13 RX ADMIN — KETOROLAC TROMETHAMINE 60 MG: 30 INJECTION, SOLUTION INTRAMUSCULAR at 07:07
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
## 2025-07-14 ENCOUNTER — TELEPHONE (OUTPATIENT)
Dept: EMERGENCY MEDICINE | Facility: HOSPITAL | Age: 25
End: 2025-07-14
Payer: COMMERCIAL

## 2025-07-14 NOTE — DISCHARGE INSTRUCTIONS
Ibuprofen and Flexeril as prescribed and directed.  Do not take Flexeril and drive or work.  Use primarily before bedtime.  Follow up with your primary care provider in 2 days. Return to the emergency department for any increase in symptoms or for any other new or worrisome symptoms.

## 2025-07-15 ENCOUNTER — HOSPITAL ENCOUNTER (EMERGENCY)
Facility: HOSPITAL | Age: 25
Discharge: HOME | End: 2025-07-15
Payer: COMMERCIAL

## 2025-07-15 VITALS
SYSTOLIC BLOOD PRESSURE: 145 MMHG | RESPIRATION RATE: 17 BRPM | BODY MASS INDEX: 31.36 KG/M2 | HEIGHT: 71 IN | DIASTOLIC BLOOD PRESSURE: 88 MMHG | WEIGHT: 224 LBS | TEMPERATURE: 98 F | HEART RATE: 68 BPM | OXYGEN SATURATION: 99 %

## 2025-07-15 DIAGNOSIS — M54.9 BACK PAIN, UNSPECIFIED BACK LOCATION, UNSPECIFIED BACK PAIN LATERALITY, UNSPECIFIED CHRONICITY: Primary | ICD-10-CM

## 2025-07-15 PROCEDURE — 99281 EMR DPT VST MAYX REQ PHY/QHP: CPT

## 2025-07-15 NOTE — Clinical Note
"Ángel Rodríguez"[place] was seen and treated in our emergency department on 7/15/2025.  He may return to work on 07/16/2025.       If you have any questions or concerns, please don't hesitate to call.      Tania Mart, EVERTP"
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
## 2025-07-17 ENCOUNTER — OFFICE VISIT (OUTPATIENT)
Dept: FAMILY MEDICINE | Facility: CLINIC | Age: 25
End: 2025-07-17
Payer: COMMERCIAL

## 2025-07-17 VITALS
RESPIRATION RATE: 19 BRPM | DIASTOLIC BLOOD PRESSURE: 85 MMHG | TEMPERATURE: 98 F | HEART RATE: 64 BPM | HEIGHT: 71 IN | SYSTOLIC BLOOD PRESSURE: 121 MMHG | WEIGHT: 226 LBS | BODY MASS INDEX: 31.64 KG/M2 | OXYGEN SATURATION: 97 %

## 2025-07-17 DIAGNOSIS — M54.9 BACK PAIN, UNSPECIFIED BACK LOCATION, UNSPECIFIED BACK PAIN LATERALITY, UNSPECIFIED CHRONICITY: Primary | ICD-10-CM

## 2025-07-17 LAB
BILIRUB SERPL-MCNC: NEGATIVE MG/DL
BLOOD URINE, POC: ABNORMAL
CLARITY, UA: ABNORMAL
COLOR, UA: ABNORMAL
GLUCOSE UR QL STRIP: NEGATIVE
KETONES UR QL STRIP: NEGATIVE
LEUKOCYTE ESTERASE URINE, POC: ABNORMAL
NITRITE, POC UA: NEGATIVE
PH, POC UA: 6
PROTEIN, POC: NEGATIVE
SPECIFIC GRAVITY, POC UA: 1.02
UROBILINOGEN, POC UA: 0.2

## 2025-07-17 RX ORDER — PREDNISONE 20 MG/1
40 TABLET ORAL DAILY
Qty: 10 TABLET | Refills: 0 | Status: SHIPPED | OUTPATIENT
Start: 2025-07-17 | End: 2025-07-22

## 2025-07-17 RX ORDER — DEXAMETHASONE SODIUM PHOSPHATE 4 MG/ML
4 INJECTION, SOLUTION INTRA-ARTICULAR; INTRALESIONAL; INTRAMUSCULAR; INTRAVENOUS; SOFT TISSUE
Status: COMPLETED | OUTPATIENT
Start: 2025-07-17 | End: 2025-07-17

## 2025-07-17 RX ORDER — SULFAMETHOXAZOLE AND TRIMETHOPRIM 800; 160 MG/1; MG/1
1 TABLET ORAL 2 TIMES DAILY
Qty: 10 TABLET | Refills: 0 | Status: SHIPPED | OUTPATIENT
Start: 2025-07-17 | End: 2025-07-22

## 2025-07-17 RX ORDER — DICLOFENAC SODIUM 75 MG/1
75 TABLET, DELAYED RELEASE ORAL 2 TIMES DAILY PRN
Qty: 20 TABLET | Refills: 0 | Status: SHIPPED | OUTPATIENT
Start: 2025-07-17

## 2025-07-17 RX ORDER — KETOROLAC TROMETHAMINE 30 MG/ML
60 INJECTION, SOLUTION INTRAMUSCULAR; INTRAVENOUS
Status: COMPLETED | OUTPATIENT
Start: 2025-07-17 | End: 2025-07-17

## 2025-07-17 RX ADMIN — DEXAMETHASONE SODIUM PHOSPHATE 4 MG: 4 INJECTION, SOLUTION INTRA-ARTICULAR; INTRALESIONAL; INTRAMUSCULAR; INTRAVENOUS; SOFT TISSUE at 04:07

## 2025-07-17 RX ADMIN — KETOROLAC TROMETHAMINE 60 MG: 30 INJECTION, SOLUTION INTRAMUSCULAR; INTRAVENOUS at 04:07

## 2025-07-17 NOTE — LETTER
July 17, 2025      [facility]  [address]  [place] [address]  Phone: [phone]  Fax: [phone]       Patient: Ángel Golden   YOB: 2000  Date of Visit: 07/17/2025    To Whom It May Concern:    Gabriel Golden  was at [facility] on 07/17/2025. The patient may return to work/school on 07/19/2025 with no restrictions. If you have any questions or concerns, or if I can be of further assistance, please do not hesitate to contact me.    Sincerely,    Dr. Jose D Mayers II

## 2025-07-17 NOTE — PROGRESS NOTES
Subjective:       Patient ID: Ángel Golden is a 24 y.o. male.    Chief Complaint: Back Pain (Pt present to clinic back pain on left side and radiates to lower back, pain been present for four days. )    Back Pain  Pertinent negatives include no abdominal pain, bladder incontinence, chest pain, dysuria, fever, headaches, leg pain, numbness or weakness.     Review of Systems   Constitutional:  Negative for activity change, appetite change, chills, diaphoresis, fatigue, fever and unexpected weight change.   HENT:  Negative for nasal congestion, dental problem, drooling, ear discharge, ear pain, facial swelling, hearing loss, mouth sores, nosebleeds, postnasal drip, rhinorrhea, sinus pressure/congestion, sneezing, sore throat, tinnitus, trouble swallowing, voice change and goiter.    Eyes:  Negative for photophobia, pain, discharge, redness, itching and visual disturbance.   Respiratory:  Negative for apnea, cough, choking, chest tightness, shortness of breath, wheezing and stridor.    Cardiovascular:  Negative for chest pain, palpitations, leg swelling and claudication.   Gastrointestinal:  Negative for abdominal distention, abdominal pain, anal bleeding, blood in stool, change in bowel habit, constipation, diarrhea, nausea, vomiting, reflux and fecal incontinence.   Endocrine: Negative for cold intolerance, heat intolerance, polydipsia, polyphagia and polyuria.   Genitourinary:  Negative for bladder incontinence, decreased urine volume, difficulty urinating, discharge, dysuria, enuresis, erectile dysfunction, flank pain, frequency, genital sores, hematuria, penile pain, testicular pain and urgency.   Musculoskeletal:  Positive for back pain and myalgias. Negative for arthralgias, gait problem, joint swelling, leg pain, neck pain, neck stiffness and joint deformity.   Integumentary:  Negative for pallor, rash, wound and mole/lesion.   Allergic/Immunologic: Negative for environmental allergies, food allergies and  frequent infections.   Neurological:  Negative for dizziness, vertigo, tremors, seizures, syncope, facial asymmetry, speech difficulty, weakness, light-headedness, numbness, headaches, coordination difficulties and memory loss.   Hematological:  Negative for adenopathy. Does not bruise/bleed easily.   Psychiatric/Behavioral:  Negative for agitation, behavioral problems, confusion, decreased concentration, dysphoric mood, hallucinations, self-injury, sleep disturbance and suicidal ideas. The patient is not nervous/anxious and is not hyperactive.          Objective:      Physical Exam  Vitals reviewed.   Constitutional:       Appearance: Normal appearance. He is normal weight.   HENT:      Head: Normocephalic and atraumatic.      Right Ear: Tympanic membrane and ear canal normal.      Left Ear: Tympanic membrane, ear canal and external ear normal.      Nose: Nose normal.      Mouth/Throat:      Mouth: Mucous membranes are moist.      Pharynx: Oropharynx is clear.   Eyes:      Extraocular Movements: Extraocular movements intact.      Conjunctiva/sclera: Conjunctivae normal.      Pupils: Pupils are equal, round, and reactive to light.   Cardiovascular:      Rate and Rhythm: Normal rate and regular rhythm.      Pulses: Normal pulses.      Heart sounds: Normal heart sounds.   Pulmonary:      Effort: Pulmonary effort is normal.      Breath sounds: Normal breath sounds.   Abdominal:      General: Abdomen is flat. Bowel sounds are normal.      Palpations: Abdomen is soft.   Musculoskeletal:         General: Tenderness present. Normal range of motion.      Cervical back: Normal range of motion and neck supple.      Comments: Left lower back ttp, left shoulder ttp, pain on abduction and flexion.    Skin:     General: Skin is warm and dry.   Neurological:      General: No focal deficit present.      Mental Status: He is alert and oriented to person, place, and time. Mental status is at baseline.   Psychiatric:         Mood and  Affect: Mood normal.         Behavior: Behavior normal.         Thought Content: Thought content normal.         Judgment: Judgment normal.         Assessment:       1. Back pain, unspecified back location, unspecified back pain laterality, unspecified chronicity        Plan:     Back pain, unspecified back location, unspecified back pain laterality, unspecified chronicity  -     POCT URINALYSIS W/O SCOPE  -     ketorolac injection 60 mg  -     dexAMETHasone injection 4 mg  -     predniSONE (DELTASONE) 20 MG tablet; Take 2 tablets (40 mg total) by mouth once daily. for 5 days  Dispense: 10 tablet; Refill: 0  -     diclofenac (VOLTAREN) 75 MG EC tablet; Take 1 tablet (75 mg total) by mouth 2 (two) times daily as needed.  Dispense: 20 tablet; Refill: 0    Other orders  -     sulfamethoxazole-trimethoprim 800-160mg (BACTRIM DS) 800-160 mg Tab; Take 1 tablet by mouth 2 (two) times daily. for 5 days  Dispense: 10 tablet; Refill: 0

## 2025-08-11 ENCOUNTER — OFFICE VISIT (OUTPATIENT)
Dept: FAMILY MEDICINE | Facility: CLINIC | Age: 25
End: 2025-08-11
Payer: COMMERCIAL

## 2025-08-11 VITALS
TEMPERATURE: 98 F | DIASTOLIC BLOOD PRESSURE: 73 MMHG | BODY MASS INDEX: 30.94 KG/M2 | OXYGEN SATURATION: 99 % | RESPIRATION RATE: 19 BRPM | HEIGHT: 71 IN | HEART RATE: 61 BPM | SYSTOLIC BLOOD PRESSURE: 118 MMHG | WEIGHT: 221 LBS

## 2025-08-11 DIAGNOSIS — M54.50 ACUTE BILATERAL LOW BACK PAIN WITHOUT SCIATICA: Primary | ICD-10-CM

## 2025-08-11 DIAGNOSIS — M54.9 BACK PAIN, UNSPECIFIED BACK LOCATION, UNSPECIFIED BACK PAIN LATERALITY, UNSPECIFIED CHRONICITY: ICD-10-CM

## 2025-08-11 DIAGNOSIS — F32.A DEPRESSION, UNSPECIFIED DEPRESSION TYPE: ICD-10-CM

## 2025-08-11 PROCEDURE — 3078F DIAST BP <80 MM HG: CPT | Mod: ,,, | Performed by: FAMILY MEDICINE

## 2025-08-11 PROCEDURE — 3074F SYST BP LT 130 MM HG: CPT | Mod: ,,, | Performed by: FAMILY MEDICINE

## 2025-08-11 PROCEDURE — 3008F BODY MASS INDEX DOCD: CPT | Mod: ,,, | Performed by: FAMILY MEDICINE

## 2025-08-11 PROCEDURE — 96372 THER/PROPH/DIAG INJ SC/IM: CPT | Mod: ,,, | Performed by: FAMILY MEDICINE

## 2025-08-11 PROCEDURE — 1160F RVW MEDS BY RX/DR IN RCRD: CPT | Mod: ,,, | Performed by: FAMILY MEDICINE

## 2025-08-11 PROCEDURE — 1159F MED LIST DOCD IN RCRD: CPT | Mod: ,,, | Performed by: FAMILY MEDICINE

## 2025-08-11 PROCEDURE — 99214 OFFICE O/P EST MOD 30 MIN: CPT | Mod: 25,,, | Performed by: FAMILY MEDICINE

## 2025-08-11 RX ORDER — TIZANIDINE 4 MG/1
4 TABLET ORAL 3 TIMES DAILY PRN
Qty: 20 TABLET | Refills: 0 | Status: SHIPPED | OUTPATIENT
Start: 2025-08-11 | End: 2025-08-21

## 2025-08-11 RX ORDER — DEXAMETHASONE SODIUM PHOSPHATE 4 MG/ML
6 INJECTION, SOLUTION INTRA-ARTICULAR; INTRALESIONAL; INTRAMUSCULAR; INTRAVENOUS; SOFT TISSUE
Status: COMPLETED | OUTPATIENT
Start: 2025-08-11 | End: 2025-08-11

## 2025-08-11 RX ORDER — SERTRALINE HYDROCHLORIDE 100 MG/1
100 TABLET, FILM COATED ORAL
COMMUNITY
Start: 2025-07-01 | End: 2025-08-11 | Stop reason: SDUPTHER

## 2025-08-11 RX ORDER — IBUPROFEN 800 MG/1
800 TABLET, FILM COATED ORAL 3 TIMES DAILY PRN
Qty: 20 TABLET | Refills: 0 | Status: SHIPPED | OUTPATIENT
Start: 2025-08-11

## 2025-08-11 RX ORDER — SERTRALINE HYDROCHLORIDE 100 MG/1
100 TABLET, FILM COATED ORAL DAILY
Qty: 60 TABLET | Refills: 2 | Status: SHIPPED | OUTPATIENT
Start: 2025-08-11

## 2025-08-11 RX ORDER — PREDNISONE 20 MG/1
TABLET ORAL
Qty: 15 TABLET | Refills: 0 | Status: SHIPPED | OUTPATIENT
Start: 2025-08-11

## 2025-08-11 RX ORDER — KETOROLAC TROMETHAMINE 30 MG/ML
60 INJECTION, SOLUTION INTRAMUSCULAR; INTRAVENOUS
Status: COMPLETED | OUTPATIENT
Start: 2025-08-11 | End: 2025-08-11

## 2025-08-11 RX ADMIN — DEXAMETHASONE SODIUM PHOSPHATE 6 MG: 4 INJECTION, SOLUTION INTRA-ARTICULAR; INTRALESIONAL; INTRAMUSCULAR; INTRAVENOUS; SOFT TISSUE at 01:08

## 2025-08-11 RX ADMIN — KETOROLAC TROMETHAMINE 60 MG: 30 INJECTION, SOLUTION INTRAMUSCULAR; INTRAVENOUS at 01:08

## 2025-08-29 ENCOUNTER — CLINICAL SUPPORT (OUTPATIENT)
Dept: REHABILITATION | Facility: HOSPITAL | Age: 25
End: 2025-08-29
Payer: COMMERCIAL

## 2025-08-29 DIAGNOSIS — M54.50 CHRONIC BILATERAL LOW BACK PAIN WITHOUT SCIATICA: Primary | ICD-10-CM

## 2025-08-29 DIAGNOSIS — R53.1 DECREASED STRENGTH: ICD-10-CM

## 2025-08-29 DIAGNOSIS — R52 PAIN: ICD-10-CM

## 2025-08-29 DIAGNOSIS — G89.29 CHRONIC BILATERAL LOW BACK PAIN WITHOUT SCIATICA: Primary | ICD-10-CM

## 2025-08-29 PROCEDURE — 97161 PT EVAL LOW COMPLEX 20 MIN: CPT
